# Patient Record
Sex: FEMALE | Race: WHITE | NOT HISPANIC OR LATINO | Employment: FULL TIME | URBAN - METROPOLITAN AREA
[De-identification: names, ages, dates, MRNs, and addresses within clinical notes are randomized per-mention and may not be internally consistent; named-entity substitution may affect disease eponyms.]

---

## 2018-07-09 LAB
HUMAN PAPILLOMAVIRUS (HPV): NORMAL
PAP RECOMMENDATION EXT: NORMAL
PAP SMEAR: NORMAL

## 2020-03-16 LAB
PAP RECOMMENDATION EXT: NORMAL
PAP SMEAR: NORMAL

## 2020-07-27 ENCOUNTER — HISTORICAL (OUTPATIENT)
Dept: ADMINISTRATIVE | Facility: HOSPITAL | Age: 40
End: 2020-07-27

## 2020-07-27 LAB — SARS-COV-2 RNA RESP QL NAA+PROBE: NOT DETECTED

## 2022-01-25 ENCOUNTER — HISTORICAL (OUTPATIENT)
Dept: LAB | Facility: HOSPITAL | Age: 42
End: 2022-01-25

## 2022-02-17 LAB
HUMAN PAPILLOMAVIRUS (HPV): NORMAL
PAP RECOMMENDATION EXT: NORMAL
PAP SMEAR: NORMAL

## 2022-04-07 ENCOUNTER — HISTORICAL (OUTPATIENT)
Dept: ADMINISTRATIVE | Facility: HOSPITAL | Age: 42
End: 2022-04-07

## 2022-04-23 VITALS
HEIGHT: 65 IN | OXYGEN SATURATION: 98 % | DIASTOLIC BLOOD PRESSURE: 71 MMHG | BODY MASS INDEX: 21.04 KG/M2 | SYSTOLIC BLOOD PRESSURE: 104 MMHG | WEIGHT: 126.31 LBS

## 2022-10-16 ENCOUNTER — OFFICE VISIT (OUTPATIENT)
Dept: URGENT CARE | Facility: CLINIC | Age: 42
End: 2022-10-16
Payer: COMMERCIAL

## 2022-10-16 VITALS
HEART RATE: 95 BPM | RESPIRATION RATE: 18 BRPM | BODY MASS INDEX: 20.16 KG/M2 | TEMPERATURE: 100 F | SYSTOLIC BLOOD PRESSURE: 113 MMHG | OXYGEN SATURATION: 98 % | DIASTOLIC BLOOD PRESSURE: 74 MMHG | WEIGHT: 121 LBS | HEIGHT: 65 IN

## 2022-10-16 DIAGNOSIS — U07.1 COVID-19: ICD-10-CM

## 2022-10-16 DIAGNOSIS — R50.9 FEVER, UNSPECIFIED FEVER CAUSE: Primary | ICD-10-CM

## 2022-10-16 LAB
CTP QC/QA: YES
MOLECULAR STREP A: NEGATIVE
POC MOLECULAR INFLUENZA A AGN: NEGATIVE
POC MOLECULAR INFLUENZA B AGN: NEGATIVE
SARS-COV-2 RDRP RESP QL NAA+PROBE: POSITIVE

## 2022-10-16 PROCEDURE — 3078F PR MOST RECENT DIASTOLIC BLOOD PRESSURE < 80 MM HG: ICD-10-PCS | Mod: CPTII,,, | Performed by: PHYSICIAN ASSISTANT

## 2022-10-16 PROCEDURE — 87651 STREP A DNA AMP PROBE: CPT | Mod: QW,,, | Performed by: PHYSICIAN ASSISTANT

## 2022-10-16 PROCEDURE — 99202 OFFICE O/P NEW SF 15 MIN: CPT | Mod: ,,, | Performed by: PHYSICIAN ASSISTANT

## 2022-10-16 PROCEDURE — 87635: ICD-10-PCS | Mod: QW,,, | Performed by: PHYSICIAN ASSISTANT

## 2022-10-16 PROCEDURE — 87502 POCT INFLUENZA A/B MOLECULAR: ICD-10-PCS | Mod: QW,,, | Performed by: PHYSICIAN ASSISTANT

## 2022-10-16 PROCEDURE — 1159F MED LIST DOCD IN RCRD: CPT | Mod: CPTII,,, | Performed by: PHYSICIAN ASSISTANT

## 2022-10-16 PROCEDURE — 87635 SARS-COV-2 COVID-19 AMP PRB: CPT | Mod: QW,,, | Performed by: PHYSICIAN ASSISTANT

## 2022-10-16 PROCEDURE — 3074F PR MOST RECENT SYSTOLIC BLOOD PRESSURE < 130 MM HG: ICD-10-PCS | Mod: CPTII,,, | Performed by: PHYSICIAN ASSISTANT

## 2022-10-16 PROCEDURE — 87502 INFLUENZA DNA AMP PROBE: CPT | Mod: QW,,, | Performed by: PHYSICIAN ASSISTANT

## 2022-10-16 PROCEDURE — 1160F PR REVIEW ALL MEDS BY PRESCRIBER/CLIN PHARMACIST DOCUMENTED: ICD-10-PCS | Mod: CPTII,,, | Performed by: PHYSICIAN ASSISTANT

## 2022-10-16 PROCEDURE — 99202 PR OFFICE/OUTPT VISIT, NEW, LEVL II, 15-29 MIN: ICD-10-PCS | Mod: ,,, | Performed by: PHYSICIAN ASSISTANT

## 2022-10-16 PROCEDURE — 1160F RVW MEDS BY RX/DR IN RCRD: CPT | Mod: CPTII,,, | Performed by: PHYSICIAN ASSISTANT

## 2022-10-16 PROCEDURE — 3074F SYST BP LT 130 MM HG: CPT | Mod: CPTII,,, | Performed by: PHYSICIAN ASSISTANT

## 2022-10-16 PROCEDURE — 87651 POCT STREP A MOLECULAR: ICD-10-PCS | Mod: QW,,, | Performed by: PHYSICIAN ASSISTANT

## 2022-10-16 PROCEDURE — 1159F PR MEDICATION LIST DOCUMENTED IN MEDICAL RECORD: ICD-10-PCS | Mod: CPTII,,, | Performed by: PHYSICIAN ASSISTANT

## 2022-10-16 PROCEDURE — 3078F DIAST BP <80 MM HG: CPT | Mod: CPTII,,, | Performed by: PHYSICIAN ASSISTANT

## 2022-10-16 RX ORDER — ALBUTEROL SULFATE 90 UG/1
1-2 AEROSOL, METERED RESPIRATORY (INHALATION) EVERY 6 HOURS PRN
Qty: 1 G | Refills: 0 | Status: SHIPPED | OUTPATIENT
Start: 2022-10-16 | End: 2023-07-18

## 2022-10-16 RX ORDER — ESCITALOPRAM OXALATE 20 MG/1
20 TABLET ORAL DAILY
COMMUNITY
Start: 2022-09-13 | End: 2023-04-03 | Stop reason: SDUPTHER

## 2022-10-16 RX ORDER — CODEINE PHOSPHATE AND GUAIFENESIN 10; 100 MG/5ML; MG/5ML
10 SOLUTION ORAL EVERY 6 HOURS PRN
Qty: 200 ML | Refills: 0 | Status: SHIPPED | OUTPATIENT
Start: 2022-10-16 | End: 2022-10-21

## 2022-10-16 RX ORDER — ACETAMINOPHEN, DIPHENHYDRAMINE HCL, PHENYLEPHRINE HCL 325; 25; 5 MG/1; MG/1; MG/1
10 TABLET ORAL NIGHTLY
COMMUNITY

## 2022-10-16 NOTE — PROGRESS NOTES
"Subjective:       Patient ID: Siri Cardoza is a 41 y.o. female.    Vitals:  height is 5' 5" (1.651 m) and weight is 54.9 kg (121 lb). Her oral temperature is 99.8 °F (37.7 °C). Her blood pressure is 113/74 and her pulse is 95. Her respiration is 18 and oxygen saturation is 98%.     Chief Complaint: Generalized Body Aches (HA, Throat and Chest has a burning sensation, Fever. Started yesterday. )    HPI female with acute body aches fever fatigue onset yesterday presents to urgent care for initial evaluation.   Generalized Body Aches     Additional comments: HA, Throat and Chest has a burning sensation, Fever.   Started yesterday.       Constitution: Positive for chills, fatigue and fever.   HENT:  Positive for congestion, postnasal drip and sore throat. Negative for ear pain, sinus pain, sinus pressure, trouble swallowing and voice change.    Neck: Negative for neck pain and neck swelling.   Cardiovascular:  Negative for chest pain.   Respiratory:  Negative for cough, shortness of breath, stridor and wheezing.    Gastrointestinal:  Positive for diarrhea.   Musculoskeletal:  Positive for muscle ache. Negative for pain, joint pain and back pain.   Skin: Negative.    Allergic/Immunologic: Negative.    Neurological:  Positive for headaches. Negative for altered mental status.   Psychiatric/Behavioral:  Negative for altered mental status.      Objective:      Physical Exam   Constitutional: She is oriented to person, place, and time. She appears well-developed. She is cooperative.  Non-toxic appearance. She appears ill.   HENT:   Head: Normocephalic and atraumatic.   Ears:   Right Ear: Hearing, tympanic membrane, external ear and ear canal normal.   Left Ear: Hearing, tympanic membrane, external ear and ear canal normal.   Nose: Congestion present. No mucosal edema, rhinorrhea or nasal deformity. No epistaxis. Right sinus exhibits no maxillary sinus tenderness and no frontal sinus tenderness. Left sinus exhibits no " maxillary sinus tenderness and no frontal sinus tenderness.   Mouth/Throat: Uvula is midline, oropharynx is clear and moist and mucous membranes are normal. No trismus in the jaw. Normal dentition. No uvula swelling. No oropharyngeal exudate, posterior oropharyngeal edema or posterior oropharyngeal erythema.   Eyes: Conjunctivae and lids are normal. No scleral icterus.   Neck: Trachea normal and phonation normal. Neck supple. No edema present. No erythema present. No neck rigidity present.   Cardiovascular: Normal rate, regular rhythm, normal heart sounds and normal pulses.   Pulmonary/Chest: Effort normal and breath sounds normal. No respiratory distress. She has no decreased breath sounds. She has no wheezes. She has no rhonchi.   Abdominal: Normal appearance.   Musculoskeletal: Normal range of motion.         General: No deformity. Normal range of motion.      Cervical back: She exhibits no tenderness.   Neurological: no focal deficit. She is alert and oriented to person, place, and time. She exhibits normal muscle tone. Coordination normal.   Skin: Skin is warm, dry, intact, not diaphoretic and not pale. Capillary refill takes less than 2 seconds.   Psychiatric: Her speech is normal and behavior is normal. Mood, judgment and thought content normal.   Nursing note and vitals reviewed.         Previous History      Review of patient's allergies indicates:  No Known Allergies    Past Medical History:   Diagnosis Date    Anxiety     Depression      Current Outpatient Medications   Medication Instructions    albuterol (PROVENTIL/VENTOLIN HFA) 90 mcg/actuation inhaler 1-2 puffs, Inhalation, Every 6 hours PRN, Rescue    EScitalopram oxalate (LEXAPRO) 20 mg, Oral, Daily    guaiFENesin-codeine 100-10 mg/5 ml (TUSSI-ORGANIDIN NR)  mg/5 mL syrup 10 mLs, Oral, Every 6 hours PRN    melatonin 10 mg, Oral, Nightly    nirmatrelvir-ritonavir 300 mg (150 mg x 2)-100 mg copackaged tablets (EUA) Take 3 tablets by mouth  "2 (two) times daily. Each dose contains 2 nirmatrelvir (pink tablets) and 1 ritonavir (white tablet). Take all 3 tablets together     Past Surgical History:   Procedure Laterality Date    AUGMENTATION OF BREAST Bilateral     WISDOM TOOTH EXTRACTION       Family History   Problem Relation Age of Onset    Acute lymphoblastic leukemia Mother     Hyperlipidemia Father     Hypertension Father     Diabetes Father     Heart attack Brother     Hypertension Brother        Social History     Tobacco Use    Smoking status: Every Day     Types: Cigarettes, Vaping with nicotine    Smokeless tobacco: Never   Substance Use Topics    Alcohol use: Yes    Drug use: Never        Physical Exam      Vital Signs Reviewed   /74   Pulse 95   Temp 99.8 °F (37.7 °C) (Oral)   Resp 18   Ht 5' 5" (1.651 m)   Wt 54.9 kg (121 lb)   LMP 10/02/2022   SpO2 98%   BMI 20.14 kg/m²        Procedures    Procedures     Labs     Results for orders placed or performed in visit on 10/16/22   POCT COVID-19 Rapid Screening   Result Value Ref Range    POC Rapid COVID Positive (A) Negative     Acceptable Yes    POCT Influenza A/B MOLECULAR   Result Value Ref Range    POC Molecular Influenza A Ag Negative Negative, Not Reported    POC Molecular Influenza B Ag Negative Negative, Not Reported     Acceptable Yes    POCT Strep A, Molecular   Result Value Ref Range    Molecular Strep A, POC Negative Negative     Acceptable Yes        Assessment:       1. Fever, unspecified fever cause    2. COVID-19            Plan:       +Covid  testing today    Start vitamin C 1000mg twice a day, zinc 100mg once a day, and vitamin D3 at least 2000 units a day. Current CDC guidelines recommend that you quarantine for 5 days starting the day after your symptoms began. Quarantine can end after 5 days as long as the last 24 hours of quarantine you are fever free and there is improvement of all your symptoms. Wear a " mask around others for 5 additional days after quarantine. Treat your symptoms as you would the common cold. If you live with anybody, isolate yourself in a separate bedroom and use a separate bathroom. If your symptoms worsen or you develop shortness of breath or a fever over 102.5 , seek medical attention immediately.  May start Paxlovid to help reduce viral sick time.  Cheratussin sparingly lows does as needed for severe cough and congestion do not take sedating narcotic cough medication and drive or operate machinery.  Albuterol inhaler 2 puffs up to 4-6 times daily as needed for cough wheeze or shortness of breath.  Recommend follow-up with primary care physician in 5-7 days for re-evaluation if not improving.  Recommended emergency department evaluation sooner if worsens  Fever, unspecified fever cause  -     POCT COVID-19 Rapid Screening  -     POCT Influenza A/B MOLECULAR  -     POCT Strep A, Molecular    COVID-19  -     guaiFENesin-codeine 100-10 mg/5 ml (TUSSI-ORGANIDIN NR)  mg/5 mL syrup; Take 10 mLs by mouth every 6 (six) hours as needed.  Dispense: 200 mL; Refill: 0  -     albuterol (PROVENTIL/VENTOLIN HFA) 90 mcg/actuation inhaler; Inhale 1-2 puffs into the lungs every 6 (six) hours as needed for Wheezing or Shortness of Breath. Rescue  Dispense: 1 g; Refill: 0  -     nirmatrelvir-ritonavir 300 mg (150 mg x 2)-100 mg copackaged tablets (EUA); Take 3 tablets by mouth 2 (two) times daily. Each dose contains 2 nirmatrelvir (pink tablets) and 1 ritonavir (white tablet). Take all 3 tablets together  Dispense: 30 tablet; Refill: 0

## 2022-10-16 NOTE — PATIENT INSTRUCTIONS
+Covid, Neg Strep, Neg Flu  testing today    Start vitamin C 1000mg twice a day, zinc 100mg once a day, and vitamin D3 at least 2000 units a day. Current CDC guidelines recommend that you quarantine for 5 days starting the day after your symptoms began. Quarantine can end after 5 days as long as the last 24 hours of quarantine you are fever free and there is improvement of all your symptoms. Wear a mask around others for 5 additional days after quarantine. Treat your symptoms as you would the common cold. If you live with anybody, isolate yourself in a separate bedroom and use a separate bathroom. If your symptoms worsen or you develop shortness of breath or a fever over 102.5 , seek medical attention immediately.  May start Paxlovid to help reduce viral sick time.  Cheratussin sparingly lows does as needed for severe cough and congestion do not take sedating narcotic cough medication and drive or operate machinery.  Albuterol inhaler 2 puffs up to 4-6 times daily as needed for cough wheeze or shortness of breath.  Recommend follow-up with primary care physician in 5-7 days for re-evaluation if not improving.  Recommended emergency department evaluation sooner if worsens

## 2022-10-16 NOTE — LETTER
October 16, 2022      [facility]  [address] [address]  Phone: [phone]  Fax: [phone]       Patient: Siri Cardoza   YOB: 1980  Date of Visit: 10/16/2022    To Whom It May Concern:    Dameon Cardoza  was at [facility] on 10/16/2022. She may return to work/school on 10/24/2022 with no restrictions. If you have any questions or concerns, or if I can be of further assistance, please do not hesitate to contact me.    Sincerely,    Francesca Cosme MA

## 2023-01-20 ENCOUNTER — OFFICE VISIT (OUTPATIENT)
Dept: URGENT CARE | Facility: CLINIC | Age: 43
End: 2023-01-20
Payer: COMMERCIAL

## 2023-01-20 VITALS
BODY MASS INDEX: 20.99 KG/M2 | HEIGHT: 65 IN | WEIGHT: 126 LBS | OXYGEN SATURATION: 99 % | DIASTOLIC BLOOD PRESSURE: 95 MMHG | RESPIRATION RATE: 18 BRPM | HEART RATE: 81 BPM | SYSTOLIC BLOOD PRESSURE: 137 MMHG | TEMPERATURE: 98 F

## 2023-01-20 DIAGNOSIS — R05.9 COUGH, UNSPECIFIED TYPE: ICD-10-CM

## 2023-01-20 DIAGNOSIS — J20.9 ACUTE BRONCHITIS, UNSPECIFIED ORGANISM: ICD-10-CM

## 2023-01-20 DIAGNOSIS — R09.81 NASAL CONGESTION: Primary | ICD-10-CM

## 2023-01-20 LAB
CTP QC/QA: YES
CTP QC/QA: YES
POC MOLECULAR INFLUENZA A AGN: NEGATIVE
POC MOLECULAR INFLUENZA B AGN: NEGATIVE
SARS-COV-2 RDRP RESP QL NAA+PROBE: NEGATIVE

## 2023-01-20 PROCEDURE — 87635: ICD-10-PCS | Mod: QW,,, | Performed by: PHYSICIAN ASSISTANT

## 2023-01-20 PROCEDURE — 3075F PR MOST RECENT SYSTOLIC BLOOD PRESS GE 130-139MM HG: ICD-10-PCS | Mod: CPTII,,, | Performed by: PHYSICIAN ASSISTANT

## 2023-01-20 PROCEDURE — 3075F SYST BP GE 130 - 139MM HG: CPT | Mod: CPTII,,, | Performed by: PHYSICIAN ASSISTANT

## 2023-01-20 PROCEDURE — 3080F PR MOST RECENT DIASTOLIC BLOOD PRESSURE >= 90 MM HG: ICD-10-PCS | Mod: CPTII,,, | Performed by: PHYSICIAN ASSISTANT

## 2023-01-20 PROCEDURE — 96372 THER/PROPH/DIAG INJ SC/IM: CPT | Mod: ,,, | Performed by: PHYSICIAN ASSISTANT

## 2023-01-20 PROCEDURE — 99213 OFFICE O/P EST LOW 20 MIN: CPT | Mod: 25,,, | Performed by: PHYSICIAN ASSISTANT

## 2023-01-20 PROCEDURE — 3008F BODY MASS INDEX DOCD: CPT | Mod: CPTII,,, | Performed by: PHYSICIAN ASSISTANT

## 2023-01-20 PROCEDURE — 87502 POCT INFLUENZA A/B MOLECULAR: ICD-10-PCS | Mod: QW,,, | Performed by: PHYSICIAN ASSISTANT

## 2023-01-20 PROCEDURE — 87502 INFLUENZA DNA AMP PROBE: CPT | Mod: QW,,, | Performed by: PHYSICIAN ASSISTANT

## 2023-01-20 PROCEDURE — 96372 PR INJECTION,THERAP/PROPH/DIAG2ST, IM OR SUBCUT: ICD-10-PCS | Mod: ,,, | Performed by: PHYSICIAN ASSISTANT

## 2023-01-20 PROCEDURE — 87635 SARS-COV-2 COVID-19 AMP PRB: CPT | Mod: QW,,, | Performed by: PHYSICIAN ASSISTANT

## 2023-01-20 PROCEDURE — 3008F PR BODY MASS INDEX (BMI) DOCUMENTED: ICD-10-PCS | Mod: CPTII,,, | Performed by: PHYSICIAN ASSISTANT

## 2023-01-20 PROCEDURE — 1159F PR MEDICATION LIST DOCUMENTED IN MEDICAL RECORD: ICD-10-PCS | Mod: CPTII,,, | Performed by: PHYSICIAN ASSISTANT

## 2023-01-20 PROCEDURE — 3080F DIAST BP >= 90 MM HG: CPT | Mod: CPTII,,, | Performed by: PHYSICIAN ASSISTANT

## 2023-01-20 PROCEDURE — 1160F RVW MEDS BY RX/DR IN RCRD: CPT | Mod: CPTII,,, | Performed by: PHYSICIAN ASSISTANT

## 2023-01-20 PROCEDURE — 1160F PR REVIEW ALL MEDS BY PRESCRIBER/CLIN PHARMACIST DOCUMENTED: ICD-10-PCS | Mod: CPTII,,, | Performed by: PHYSICIAN ASSISTANT

## 2023-01-20 PROCEDURE — 99213 PR OFFICE/OUTPT VISIT, EST, LEVL III, 20-29 MIN: ICD-10-PCS | Mod: 25,,, | Performed by: PHYSICIAN ASSISTANT

## 2023-01-20 PROCEDURE — 1159F MED LIST DOCD IN RCRD: CPT | Mod: CPTII,,, | Performed by: PHYSICIAN ASSISTANT

## 2023-01-20 RX ORDER — PROMETHAZINE HYDROCHLORIDE AND DEXTROMETHORPHAN HYDROBROMIDE 6.25; 15 MG/5ML; MG/5ML
5 SYRUP ORAL EVERY 8 HOURS PRN
Qty: 118 ML | Refills: 0 | Status: SHIPPED | OUTPATIENT
Start: 2023-01-20 | End: 2023-01-20

## 2023-01-20 RX ORDER — METHYLPREDNISOLONE 4 MG/1
TABLET ORAL
Qty: 1 EACH | Refills: 0 | Status: SHIPPED | OUTPATIENT
Start: 2023-01-20 | End: 2023-07-18

## 2023-01-20 RX ORDER — MULTIVITAMIN
1 TABLET ORAL DAILY
COMMUNITY

## 2023-01-20 RX ORDER — CODEINE PHOSPHATE AND GUAIFENESIN 10; 100 MG/5ML; MG/5ML
10 SOLUTION ORAL EVERY 6 HOURS PRN
Qty: 118 ML | Refills: 0 | Status: SHIPPED | OUTPATIENT
Start: 2023-01-20 | End: 2023-01-25

## 2023-01-20 RX ORDER — DEXAMETHASONE SODIUM PHOSPHATE 100 MG/10ML
8 INJECTION INTRAMUSCULAR; INTRAVENOUS
Status: COMPLETED | OUTPATIENT
Start: 2023-01-20 | End: 2023-01-20

## 2023-01-20 RX ORDER — AZITHROMYCIN 250 MG/1
250 TABLET, FILM COATED ORAL
COMMUNITY
Start: 2023-01-18 | End: 2023-07-18

## 2023-01-20 RX ADMIN — DEXAMETHASONE SODIUM PHOSPHATE 8 MG: 100 INJECTION INTRAMUSCULAR; INTRAVENOUS at 05:01

## 2023-01-20 NOTE — PROGRESS NOTES
"Subjective:       Patient ID: Siri Cardoza is a 42 y.o. female.    Vitals:  height is 5' 5" (1.651 m) and weight is 57.2 kg (126 lb). Her oral temperature is 97.9 °F (36.6 °C). Her blood pressure is 137/95 (abnormal) and her pulse is 81. Her respiration is 18 and oxygen saturation is 99%.     Chief Complaint: Cough and Nasal Congestion (Cough, nasal congestion x 1 week )    HPI  female reports vapes smoker noticing over the last week cough cold congestion night sweats body aches and fatigue states having negative COVID test at work given prescription by her nurse practitioner azithromycin antibiotic coverage states after 3 days on antibiotic still having persistent cough and chest tightness using leftover albuterol MDI this afternoon presents to urgent care for evaluation.   Nasal Congestion     Additional comments: Cough, nasal congestion x 1 week     Cough  Associated symptoms include myalgias and shortness of breath. Pertinent negatives include no ear pain, fever, headaches or sore throat.     Constitution: Positive for sweating and fatigue. Negative for fever.   HENT:  Positive for congestion and sinus pressure. Negative for ear pain, sinus pain, sore throat, trouble swallowing and voice change.    Neck: Negative for neck pain and neck swelling.   Cardiovascular:  Negative for palpitations.   Respiratory:  Positive for chest tightness, cough and shortness of breath. Negative for stridor.    Gastrointestinal: Negative.    Musculoskeletal:  Positive for muscle ache.   Skin: Negative.  Negative for erythema.   Allergic/Immunologic: Negative.    Neurological:  Negative for headaches and altered mental status.   Psychiatric/Behavioral:  Positive for sleep disturbance. Negative for altered mental status.      Objective:      Physical Exam   Constitutional: She is oriented to person, place, and time. She appears well-developed. She is cooperative.  Non-toxic appearance.      Comments:Awake alert ambulatory nasally " congested fatigued female speaks in complete sentences     HENT:   Head: Normocephalic.   Ears:   Right Ear: Hearing, tympanic membrane, external ear and ear canal normal.   Left Ear: Hearing, tympanic membrane, external ear and ear canal normal.   Nose: Congestion present. No mucosal edema, rhinorrhea or nasal deformity. No epistaxis. Right sinus exhibits no maxillary sinus tenderness and no frontal sinus tenderness. Left sinus exhibits no maxillary sinus tenderness and no frontal sinus tenderness.   Mouth/Throat: Uvula is midline, oropharynx is clear and moist and mucous membranes are normal. Mucous membranes are moist. No trismus in the jaw. Normal dentition. No uvula swelling. No oropharyngeal exudate, posterior oropharyngeal edema or posterior oropharyngeal erythema.   Eyes: Conjunctivae and lids are normal. No scleral icterus.   Neck: Trachea normal and phonation normal. Neck supple. No edema present. No erythema present. No neck rigidity present.   Cardiovascular: Normal rate, regular rhythm, normal heart sounds and normal pulses.   No murmur heard.  Pulmonary/Chest: Effort normal. No stridor. No respiratory distress. She has no decreased breath sounds. She has no wheezes. She has no rhonchi. She has no rales.         Comments: Mild Dry cough    Musculoskeletal: Normal range of motion.         General: No swelling. Normal range of motion.      Cervical back: She exhibits no tenderness.   Lymphadenopathy:     She has cervical adenopathy.   Neurological: no focal deficit. She is alert and oriented to person, place, and time. She exhibits normal muscle tone.   Skin: Skin is warm, dry, intact, not diaphoretic, not pale and no rash. No erythema   Psychiatric: Her speech is normal and behavior is normal. Mood, judgment and thought content normal.   Nursing note and vitals reviewed.         Previous History      Review of patient's allergies indicates:  No Known Allergies    Past Medical History:   Diagnosis Date     "Anxiety     Depression     Known health problems: none      Current Outpatient Medications   Medication Instructions    albuterol (PROVENTIL/VENTOLIN HFA) 90 mcg/actuation inhaler 1-2 puffs, Inhalation, Every 6 hours PRN, Rescue    azithromycin (Z-JENNY) 250 mg, Oral    EScitalopram oxalate (LEXAPRO) 20 mg, Oral, Daily    melatonin 10 mg, Oral, Nightly    methylPREDNISolone (MEDROL DOSEPACK) 4 mg tablet use as directed    multivitamin with folic acid 400 mcg Tab 1 tablet, Oral, Daily    nirmatrelvir-ritonavir 300 mg (150 mg x 2)-100 mg copackaged tablets (EUA) Take 3 tablets by mouth 2 (two) times daily. Each dose contains 2 nirmatrelvir (pink tablets) and 1 ritonavir (white tablet). Take all 3 tablets together    promethazine-dextromethorphan (PROMETHAZINE-DM) 6.25-15 mg/5 mL Syrp 5 mLs, Oral, Every 8 hours PRN     Past Surgical History:   Procedure Laterality Date    AUGMENTATION OF BREAST Bilateral     WISDOM TOOTH EXTRACTION       Family History   Problem Relation Age of Onset    Acute lymphoblastic leukemia Mother     Hyperlipidemia Father     Hypertension Father     Diabetes Father     Heart attack Brother     Hypertension Brother        Social History     Tobacco Use    Smoking status: Every Day     Types: Cigarettes, Vaping with nicotine    Smokeless tobacco: Never   Substance Use Topics    Alcohol use: Yes    Drug use: Never        Physical Exam      Vital Signs Reviewed   BP (!) 137/95   Pulse 81   Temp 97.9 °F (36.6 °C) (Oral)   Resp 18   Ht 5' 5" (1.651 m)   Wt 57.2 kg (126 lb)   SpO2 99%   BMI 20.97 kg/m²        Procedures    Procedures     Labs     Results for orders placed or performed in visit on 01/20/23   POCT COVID-19 Rapid Screening   Result Value Ref Range    POC Rapid COVID Negative Negative     Acceptable Yes    POCT Influenza A/B MOLECULAR   Result Value Ref Range    POC Molecular Influenza A Ag Negative Negative, Not Reported    POC Molecular Influenza B Ag Negative " Negative, Not Reported     Acceptable Yes        Assessment:       1. Nasal congestion    2. Cough, unspecified type    3. Acute bronchitis, unspecified organism          Plan:     Patient understands concern for acute bronchitis, vaping cessation, continued use of personal albuterol MDI and discharge with prescription cough medication and steroid.  Patient reports having restless leg problems when taking Phenergan are Phenergan DM.  Patient accepts Cheratussin as alternative.  Patient ready for discharge now.    Negative influenza, negative COVID testing today though high concern for acute viral bronchitis.    Recommend albuterol inhaler 2 puffs up to 4-6 times daily as needed for cough wheeze chest tightness or shortness for breath recommend steroid Dosepak starting tomorrow in addition to steroid injection received today to help reduce cough and inflammation.  Cheratussin sparingly lows doses needed for severe cough and rest.  Do not take sedating cough medication drive or operate machinery.  Recommend smoking and vaping cessation short and long-term.  Recommend follow-up with primary care physician in 1 week for re-evaluation concern for bronchitis re-evaluation and continued long-term care planning if not improving.  Nasal congestion  -     POCT COVID-19 Rapid Screening  -     POCT Influenza A/B MOLECULAR    Cough, unspecified type    Acute bronchitis, unspecified organism    Other orders  -     dexAMETHasone injection 8 mg  -     methylPREDNISolone (MEDROL DOSEPACK) 4 mg tablet; use as directed  Dispense: 1 each; Refill: 0  -     promethazine-dextromethorphan (PROMETHAZINE-DM) 6.25-15 mg/5 mL Syrp; Take 5 mLs by mouth every 8 (eight) hours as needed (cough).  Dispense: 118 mL; Refill: 0

## 2023-01-20 NOTE — PATIENT INSTRUCTIONS
Negative influenza, negative COVID testing today though high concern for acute viral bronchitis.    Recommend albuterol inhaler 2 puffs up to 4-6 times daily as needed for cough wheeze chest tightness or shortness for breath recommend steroid Dosepak starting tomorrow in addition to steroid injection received today to help reduce cough and inflammation.  Cheratussin sparingly lows doses needed for severe cough and rest.  Do not take sedating cough medication drive or operate machinery.  Recommend smoking and vaping cessation short and long-term.  Recommend follow-up with primary care physician in 1 week for re-evaluation concern for bronchitis re-evaluation and continued long-term care planning if not improving.

## 2023-04-03 DIAGNOSIS — F41.9 ANXIETY: Primary | ICD-10-CM

## 2023-04-03 RX ORDER — ESCITALOPRAM OXALATE 20 MG/1
20 TABLET ORAL DAILY
Qty: 30 TABLET | Refills: 2 | Status: SHIPPED | OUTPATIENT
Start: 2023-04-03 | End: 2023-07-10

## 2023-07-08 DIAGNOSIS — F41.9 ANXIETY: ICD-10-CM

## 2023-07-10 RX ORDER — ESCITALOPRAM OXALATE 20 MG/1
TABLET ORAL
Qty: 30 TABLET | Refills: 5 | Status: SHIPPED | OUTPATIENT
Start: 2023-07-10

## 2023-07-18 ENCOUNTER — DOCUMENTATION ONLY (OUTPATIENT)
Dept: FAMILY MEDICINE | Facility: CLINIC | Age: 43
End: 2023-07-18

## 2023-07-18 ENCOUNTER — OFFICE VISIT (OUTPATIENT)
Dept: FAMILY MEDICINE | Facility: CLINIC | Age: 43
End: 2023-07-18
Payer: COMMERCIAL

## 2023-07-18 VITALS
HEIGHT: 65 IN | BODY MASS INDEX: 21.05 KG/M2 | OXYGEN SATURATION: 99 % | WEIGHT: 126.38 LBS | DIASTOLIC BLOOD PRESSURE: 76 MMHG | SYSTOLIC BLOOD PRESSURE: 108 MMHG | TEMPERATURE: 99 F | RESPIRATION RATE: 18 BRPM | HEART RATE: 84 BPM

## 2023-07-18 DIAGNOSIS — Z00.00 ROUTINE GENERAL MEDICAL EXAMINATION AT A HEALTH CARE FACILITY: Primary | ICD-10-CM

## 2023-07-18 DIAGNOSIS — F32.5 MAJOR DEPRESSIVE DISORDER, SINGLE EPISODE, IN FULL REMISSION: ICD-10-CM

## 2023-07-18 DIAGNOSIS — Z12.31 BREAST CANCER SCREENING BY MAMMOGRAM: ICD-10-CM

## 2023-07-18 PROCEDURE — 3008F PR BODY MASS INDEX (BMI) DOCUMENTED: ICD-10-PCS | Mod: CPTII,,, | Performed by: FAMILY MEDICINE

## 2023-07-18 PROCEDURE — 3074F PR MOST RECENT SYSTOLIC BLOOD PRESSURE < 130 MM HG: ICD-10-PCS | Mod: CPTII,,, | Performed by: FAMILY MEDICINE

## 2023-07-18 PROCEDURE — 1159F PR MEDICATION LIST DOCUMENTED IN MEDICAL RECORD: ICD-10-PCS | Mod: CPTII,,, | Performed by: FAMILY MEDICINE

## 2023-07-18 PROCEDURE — 1160F RVW MEDS BY RX/DR IN RCRD: CPT | Mod: CPTII,,, | Performed by: FAMILY MEDICINE

## 2023-07-18 PROCEDURE — 1159F MED LIST DOCD IN RCRD: CPT | Mod: CPTII,,, | Performed by: FAMILY MEDICINE

## 2023-07-18 PROCEDURE — 3074F SYST BP LT 130 MM HG: CPT | Mod: CPTII,,, | Performed by: FAMILY MEDICINE

## 2023-07-18 PROCEDURE — 99396 PREV VISIT EST AGE 40-64: CPT | Mod: ,,, | Performed by: FAMILY MEDICINE

## 2023-07-18 PROCEDURE — 99396 PR PREVENTIVE VISIT,EST,40-64: ICD-10-PCS | Mod: ,,, | Performed by: FAMILY MEDICINE

## 2023-07-18 PROCEDURE — 3078F PR MOST RECENT DIASTOLIC BLOOD PRESSURE < 80 MM HG: ICD-10-PCS | Mod: CPTII,,, | Performed by: FAMILY MEDICINE

## 2023-07-18 PROCEDURE — 3078F DIAST BP <80 MM HG: CPT | Mod: CPTII,,, | Performed by: FAMILY MEDICINE

## 2023-07-18 PROCEDURE — 1160F PR REVIEW ALL MEDS BY PRESCRIBER/CLIN PHARMACIST DOCUMENTED: ICD-10-PCS | Mod: CPTII,,, | Performed by: FAMILY MEDICINE

## 2023-07-18 PROCEDURE — 3008F BODY MASS INDEX DOCD: CPT | Mod: CPTII,,, | Performed by: FAMILY MEDICINE

## 2023-07-18 NOTE — PROGRESS NOTES
Patient ID: [id number]     Chief Complaint: Annual Exam        HPI:     Siri Cardoza is a 42 y.o. female here today for an annual wellness visit.  She has not had bloodwork completed prior to visit as ordered but will do so in the near future. Overall she feels well. No other complaints today.         ----------------------------  Anxiety  Major depressive disorder     Past Surgical History:   Procedure Laterality Date    AUGMENTATION OF BREAST Bilateral     WISDOM TOOTH EXTRACTION         Review of patient's allergies indicates:   Allergen Reactions    Phenergan plain      reaction       Outpatient Medications Marked as Taking for the 7/18/23 encounter (Office Visit) with Sincere Rehman, DO   Medication Sig Dispense Refill    EScitalopram oxalate (LEXAPRO) 20 MG tablet TAKE 1 TABLET BY MOUTH EVERY DAY 30 tablet 5    melatonin 10 mg Tab Take 10 mg by mouth every evening.      multivitamin with folic acid 400 mcg Tab Take 1 tablet by mouth once daily.         Social History     Socioeconomic History    Marital status: Unknown   Tobacco Use    Smoking status: Every Day     Types: Cigarettes, Vaping with nicotine    Smokeless tobacco: Never   Substance and Sexual Activity    Alcohol use: Yes    Drug use: Never    Sexual activity: Yes     Social Determinants of Health     Financial Resource Strain: Low Risk     Difficulty of Paying Living Expenses: Not hard at all   Food Insecurity: No Food Insecurity    Worried About Running Out of Food in the Last Year: Never true    Ran Out of Food in the Last Year: Never true   Transportation Needs: No Transportation Needs    Lack of Transportation (Medical): No    Lack of Transportation (Non-Medical): No   Physical Activity: Sufficiently Active    Days of Exercise per Week: 5 days    Minutes of Exercise per Session: 30 min   Stress: No Stress Concern Present    Feeling of Stress : Only a little   Social Connections: Moderately Integrated    Frequency of Communication with  "Friends and Family: More than three times a week    Frequency of Social Gatherings with Friends and Family: Three times a week    Attends Islam Services: More than 4 times per year    Active Member of Clubs or Organizations: No    Attends Club or Organization Meetings: 1 to 4 times per year    Marital Status:    Housing Stability: Low Risk     Unable to Pay for Housing in the Last Year: No    Number of Places Lived in the Last Year: 1    Unstable Housing in the Last Year: No        Family History   Problem Relation Age of Onset    Acute lymphoblastic leukemia Mother     Hyperlipidemia Father     Hypertension Father     Diabetes Father     Heart attack Brother     Hypertension Brother         Patient Care Team:  Sincere Rehman DO as PCP - General (Family Medicine)       Subjective:     Review of Systems   Constitutional:  Positive for malaise/fatigue. Negative for chills and fever.   Respiratory:  Negative for shortness of breath.    Cardiovascular:  Negative for chest pain.   Gastrointestinal:  Negative for constipation and diarrhea.   Neurological:  Negative for headaches.   Psychiatric/Behavioral:  Negative for depression. The patient is not nervous/anxious and does not have insomnia.      See HPI for details      All Other ROS: Negative except as stated in HPI.       Objective:     /76   Pulse 84   Temp 98.6 °F (37 °C) (Tympanic)   Resp 18   Ht 5' 4.96" (1.65 m)   Wt 57.3 kg (126 lb 6.4 oz)   LMP 07/16/2023   SpO2 99%   BMI 21.06 kg/m²     Physical Exam  Vitals reviewed.   Constitutional:       General: She is not in acute distress.     Appearance: Normal appearance.   Cardiovascular:      Rate and Rhythm: Normal rate and regular rhythm.      Heart sounds: No murmur heard.    No friction rub. No gallop.   Pulmonary:      Effort: No respiratory distress.      Breath sounds: No wheezing, rhonchi or rales.   Musculoskeletal:         General: No swelling, tenderness or deformity.      " Right lower leg: No edema.      Left lower leg: No edema.   Skin:     General: Skin is warm and dry.      Findings: No lesion or rash.   Neurological:      General: No focal deficit present.      Mental Status: She is alert.   Psychiatric:         Mood and Affect: Mood normal.         Assessment:       ICD-10-CM ICD-9-CM   1. Routine general medical examination at a health care facility  Z00.00 V70.0   2. Major depressive disorder, single episode, in full remission  F32.5 296.26   3. Breast cancer screening by mammogram  Z12.31 V76.12        Plan:         Health Maintenance Topics with due status: Not Due       Topic Last Completion Date    TETANUS VACCINE 07/14/2017    Cervical Cancer Screening 02/17/2022    Influenza Vaccine 11/16/2022        Cervical Cancer Screening -2/17/22. Next due 2/17/25.   Breast Cancer Screening - Will order today.     Eye Exam - Recommend annually.  Dental Exam - Recommend biannual exams.     Vaccinations -   Immunization History   Administered Date(s) Administered    COVID-19, MRNA, LN-S, PF (Pfizer) (Purple Cap) 07/29/2021, 08/18/2021    DTP 02/14/1983, 04/18/1983, 06/20/1983, 03/19/1984, 04/07/1988    Influenza - Quadrivalent - PF *Preferred* (6 months and older) 09/25/2019, 09/16/2020, 12/16/2021, 11/16/2022    MMR 03/19/1984    OPV 02/14/1983, 04/18/1983, 06/20/1983, 03/19/1984, 04/07/1988    Tdap 07/14/2017            1. Routine general medical examination at a health care facility    2. Major depressive disorder, single episode, in full remission  well controlled on current prescription medication    3. Breast cancer screening by mammogram  - Mammo Digital Screening Bilat w/ [facility]; Future        Medication List with Changes/Refills   Current Medications    ESCITALOPRAM OXALATE (LEXAPRO) 20 MG TABLET    TAKE 1 TABLET BY MOUTH EVERY DAY       Start Date: 7/10/2023 End Date: --    MELATONIN 10 MG TAB    Take 10 mg by mouth every evening.       Start Date: --        End Date: --     MULTIVITAMIN WITH FOLIC ACID 400 MCG TAB    Take 1 tablet by mouth once daily.       Start Date: --        End Date: --   Discontinued Medications    ALBUTEROL (PROVENTIL/VENTOLIN HFA) 90 MCG/ACTUATION INHALER    Inhale 1-2 puffs into the lungs every 6 (six) hours as needed for Wheezing or Shortness of Breath. Rescue       Start Date: 10/16/2022End Date: 7/18/2023    AZITHROMYCIN (Z-JENNY) 250 MG TABLET    Take 250 mg by mouth.       Start Date: 1/18/2023 End Date: 7/18/2023    METHYLPREDNISOLONE (MEDROL DOSEPACK) 4 MG TABLET    use as directed       Start Date: 1/20/2023 End Date: 7/18/2023    NIRMATRELVIR-RITONAVIR 300 MG (150 MG X 2)-100 MG COPACKAGED TABLETS (EUA)    Take 3 tablets by mouth 2 (two) times daily. Each dose contains 2 nirmatrelvir (pink tablets) and 1 ritonavir (white tablet). Take all 3 tablets together       Start Date: 10/16/2022End Date: 7/18/2023          The patient's Health Maintenance was reviewed and the following appears to be due at this time:   Health Maintenance Due   Topic Date Due    Hepatitis C Screening  Never done    Pneumococcal Vaccines (Age 0-64) (1 - PCV) Never done    HIV Screening  Never done    Mammogram  Never done    COVID-19 Vaccine (3 - Pfizer series) 10/13/2021         Follow up in about 1 year (around 7/18/2024) for Wellness. In addition to their scheduled follow up, the patient has also been instructed to follow up on as needed basis.

## 2023-07-19 ENCOUNTER — DOCUMENTATION ONLY (OUTPATIENT)
Dept: FAMILY MEDICINE | Facility: CLINIC | Age: 43
End: 2023-07-19
Payer: COMMERCIAL

## 2023-08-09 ENCOUNTER — HOSPITAL ENCOUNTER (OUTPATIENT)
Dept: RADIOLOGY | Facility: HOSPITAL | Age: 43
Discharge: HOME | End: 2023-08-09
Attending: FAMILY MEDICINE
Payer: COMMERCIAL

## 2023-08-09 DIAGNOSIS — Z12.31 BREAST CANCER SCREENING BY MAMMOGRAM: ICD-10-CM

## 2023-08-09 PROCEDURE — 77063 MAMMO DIGITAL SCREENING BILAT WITH TOMO: ICD-10-PCS | Mod: 26,,, | Performed by: STUDENT IN AN ORGANIZED HEALTH CARE EDUCATION/TRAINING PROGRAM

## 2023-08-09 PROCEDURE — 77063 BREAST TOMOSYNTHESIS BI: CPT | Mod: 26,,, | Performed by: STUDENT IN AN ORGANIZED HEALTH CARE EDUCATION/TRAINING PROGRAM

## 2023-08-09 PROCEDURE — 77067 SCR MAMMO BI INCL CAD: CPT | Mod: 26,,, | Performed by: STUDENT IN AN ORGANIZED HEALTH CARE EDUCATION/TRAINING PROGRAM

## 2023-08-09 PROCEDURE — 77067 MAMMO DIGITAL SCREENING BILAT WITH TOMO: ICD-10-PCS | Mod: 26,,, | Performed by: STUDENT IN AN ORGANIZED HEALTH CARE EDUCATION/TRAINING PROGRAM

## 2023-08-09 PROCEDURE — 77067 SCR MAMMO BI INCL CAD: CPT | Mod: TC

## 2023-09-01 ENCOUNTER — OFFICE VISIT (OUTPATIENT)
Dept: URGENT CARE | Facility: CLINIC | Age: 43
End: 2023-09-01
Payer: COMMERCIAL

## 2023-09-01 VITALS
RESPIRATION RATE: 18 BRPM | HEART RATE: 82 BPM | WEIGHT: 126 LBS | BODY MASS INDEX: 21.51 KG/M2 | TEMPERATURE: 99 F | HEIGHT: 64 IN | SYSTOLIC BLOOD PRESSURE: 113 MMHG | OXYGEN SATURATION: 98 % | DIASTOLIC BLOOD PRESSURE: 81 MMHG

## 2023-09-01 DIAGNOSIS — R09.81 CONGESTION OF NASAL SINUS: Primary | ICD-10-CM

## 2023-09-01 DIAGNOSIS — J10.1 INFLUENZA A: ICD-10-CM

## 2023-09-01 LAB
CTP QC/QA: YES
MOLECULAR STREP A: NEGATIVE
POC MOLECULAR INFLUENZA A AGN: POSITIVE
POC MOLECULAR INFLUENZA B AGN: NEGATIVE
SARS-COV-2 RDRP RESP QL NAA+PROBE: NEGATIVE

## 2023-09-01 PROCEDURE — 87651 STREP A DNA AMP PROBE: CPT | Mod: QW,,, | Performed by: PHYSICIAN ASSISTANT

## 2023-09-01 PROCEDURE — 87635: ICD-10-PCS | Mod: QW,,, | Performed by: PHYSICIAN ASSISTANT

## 2023-09-01 PROCEDURE — 99214 OFFICE O/P EST MOD 30 MIN: CPT | Mod: 25,,, | Performed by: PHYSICIAN ASSISTANT

## 2023-09-01 PROCEDURE — 87635 SARS-COV-2 COVID-19 AMP PRB: CPT | Mod: QW,,, | Performed by: PHYSICIAN ASSISTANT

## 2023-09-01 PROCEDURE — 99214 PR OFFICE/OUTPT VISIT, EST, LEVL IV, 30-39 MIN: ICD-10-PCS | Mod: 25,,, | Performed by: PHYSICIAN ASSISTANT

## 2023-09-01 PROCEDURE — 87651 POCT STREP A MOLECULAR: ICD-10-PCS | Mod: QW,,, | Performed by: PHYSICIAN ASSISTANT

## 2023-09-01 PROCEDURE — 96372 THER/PROPH/DIAG INJ SC/IM: CPT | Mod: ,,, | Performed by: PHYSICIAN ASSISTANT

## 2023-09-01 PROCEDURE — 87502 INFLUENZA DNA AMP PROBE: CPT | Mod: QW,,, | Performed by: PHYSICIAN ASSISTANT

## 2023-09-01 PROCEDURE — 96372 PR INJECTION,THERAP/PROPH/DIAG2ST, IM OR SUBCUT: ICD-10-PCS | Mod: ,,, | Performed by: PHYSICIAN ASSISTANT

## 2023-09-01 PROCEDURE — 87502 POCT INFLUENZA A/B MOLECULAR: ICD-10-PCS | Mod: QW,,, | Performed by: PHYSICIAN ASSISTANT

## 2023-09-01 RX ORDER — OSELTAMIVIR PHOSPHATE 75 MG/1
75 CAPSULE ORAL 2 TIMES DAILY
Qty: 10 CAPSULE | Refills: 0 | Status: SHIPPED | OUTPATIENT
Start: 2023-09-01 | End: 2023-09-06

## 2023-09-01 RX ORDER — PREDNISONE 10 MG/1
10 TABLET ORAL 2 TIMES DAILY
Qty: 10 TABLET | Refills: 0 | Status: SHIPPED | OUTPATIENT
Start: 2023-09-01 | End: 2023-09-06

## 2023-09-01 RX ORDER — CODEINE PHOSPHATE AND GUAIFENESIN 10; 100 MG/5ML; MG/5ML
10 SOLUTION ORAL EVERY 6 HOURS PRN
Qty: 118 ML | Refills: 0 | Status: SHIPPED | OUTPATIENT
Start: 2023-09-01 | End: 2023-09-06

## 2023-09-01 RX ORDER — DEXAMETHASONE SODIUM PHOSPHATE 100 MG/10ML
10 INJECTION INTRAMUSCULAR; INTRAVENOUS
Status: COMPLETED | OUTPATIENT
Start: 2023-09-01 | End: 2023-09-01

## 2023-09-01 RX ADMIN — DEXAMETHASONE SODIUM PHOSPHATE 10 MG: 100 INJECTION INTRAMUSCULAR; INTRAVENOUS at 12:09

## 2023-09-01 NOTE — PROGRESS NOTES
"Subjective:      Patient ID: Siri Cardoza is a 42 y.o. female.    Vitals:  height is 5' 4" (1.626 m) and weight is 57.2 kg (126 lb). Her temperature is 99.2 °F (37.3 °C). Her blood pressure is 113/81 and her pulse is 82. Her respiration is 18 and oxygen saturation is 98%.     Chief Complaint: Cough (Pt presents to clinic with cough, congestion, fatigue , headache. Symptomatic x last night.)    HPI  patient reports acute onset of symptoms last night waking up this morning still feeling ill presents to urgent Care for initial evaluation.  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   Cough     Additional comments: Pt presents to clinic with cough, congestion,   fatigue , headache. Symptomatic x last night.    Cough  This is a new problem. The current episode started yesterday. Associated symptoms include chills, headaches, myalgias and postnasal drip. Pertinent negatives include no chest pain, ear pain, fever, sore throat, shortness of breath or wheezing.       Constitution: Positive for chills and fatigue. Negative for fever.   HENT:  Positive for congestion, postnasal drip and sinus pressure. Negative for ear pain, sinus pain, sore throat, trouble swallowing and voice change.    Neck: Negative for neck pain and neck swelling.   Cardiovascular:  Negative for chest pain.   Respiratory:  Positive for cough. Negative for shortness of breath, stridor and wheezing.    Gastrointestinal: Negative.    Musculoskeletal:  Positive for muscle ache.   Skin: Negative.  Negative for erythema.   Allergic/Immunologic: Negative.    Neurological:  Positive for headaches.   Psychiatric/Behavioral:  Negative for sleep disturbance.       Objective:     Physical Exam   Constitutional: She is oriented to person, place, and time. She appears well-developed. She is cooperative.  Non-toxic appearance. She appears ill.      Comments:Awake alert ambulatory nasally congested female "     HENT:   Head: Normocephalic.   Ears:   Right Ear: Hearing, tympanic membrane, external ear and ear canal normal.   Left Ear: Hearing, tympanic membrane, external ear and ear canal normal.   Nose: Congestion present. No mucosal edema, rhinorrhea or nasal deformity. No epistaxis. Right sinus exhibits no maxillary sinus tenderness and no frontal sinus tenderness. Left sinus exhibits no maxillary sinus tenderness and no frontal sinus tenderness.      Comments: Moderate  Mouth/Throat: Uvula is midline, oropharynx is clear and moist and mucous membranes are normal. Mucous membranes are moist. No trismus in the jaw. Normal dentition. No uvula swelling. No oropharyngeal exudate, posterior oropharyngeal edema or posterior oropharyngeal erythema.   Eyes: Conjunctivae and lids are normal. No scleral icterus.   Neck: Trachea normal and phonation normal. Neck supple. No edema present. No erythema present. No neck rigidity present.   Cardiovascular: Normal rate, regular rhythm, normal heart sounds and normal pulses.   No murmur heard.Exam reveals no gallop.   Pulmonary/Chest: Effort normal and breath sounds normal. No stridor. No respiratory distress. She has no decreased breath sounds. She has no wheezes. She has no rhonchi. She has no rales.   Musculoskeletal: Normal range of motion.         General: No swelling. Normal range of motion.      Cervical back: She exhibits no tenderness.   Lymphadenopathy:     She has no cervical adenopathy.   Neurological: no focal deficit. She is alert and oriented to person, place, and time. She exhibits normal muscle tone.   Skin: Skin is warm, dry, intact, not diaphoretic, not pale and no rash. No erythema   Psychiatric: Her speech is normal and behavior is normal. Mood, judgment and thought content normal.   Nursing note and vitals reviewed.       Previous History      Review of patient's allergies indicates:   Allergen Reactions    Phenergan plain      reaction       Past Medical  "History:   Diagnosis Date    Anxiety     Major depressive disorder      Current Outpatient Medications   Medication Instructions    EScitalopram oxalate (LEXAPRO) 20 MG tablet TAKE 1 TABLET BY MOUTH EVERY DAY    guaiFENesin-codeine 100-10 mg/5 ml (TUSSI-ORGANIDIN NR)  mg/5 mL syrup 10 mLs, Oral, Every 6 hours PRN    melatonin 10 mg, Oral, Nightly    multivitamin with folic acid 400 mcg Tab 1 tablet, Oral, Daily    oseltamivir (TAMIFLU) 75 mg, Oral, 2 times daily    predniSONE (DELTASONE) 10 mg, Oral, 2 times daily     Past Surgical History:   Procedure Laterality Date    AUGMENTATION OF BREAST Bilateral     WISDOM TOOTH EXTRACTION       Family History   Problem Relation Age of Onset    Acute lymphoblastic leukemia Mother     Hyperlipidemia Father     Hypertension Father     Diabetes Father     Heart attack Brother     Hypertension Brother        Social History     Tobacco Use    Smoking status: Every Day     Types: Cigarettes, Vaping with nicotine    Smokeless tobacco: Never   Substance Use Topics    Alcohol use: Yes    Drug use: Never        Physical Exam      Vital Signs Reviewed   /81   Pulse 82   Temp 99.2 °F (37.3 °C)   Resp 18   Ht 5' 4" (1.626 m)   Wt 57.2 kg (126 lb)   SpO2 98%   BMI 21.63 kg/m²        Procedures    Procedures     Labs     Results for orders placed or performed in visit on 09/01/23   POCT COVID-19 Rapid Screening   Result Value Ref Range    POC Rapid COVID Negative Negative     Acceptable Yes    POCT Influenza A/B Molecular   Result Value Ref Range    POC Molecular Influenza A Ag Positive (A) Negative, Not Reported    POC Molecular Influenza B Ag Negative Negative, Not Reported     Acceptable Yes    POCT Strep A, Molecular   Result Value Ref Range    Molecular Strep A, POC Negative Negative     Acceptable Yes          Assessment:     1. Congestion of nasal sinus    2. Influenza A        Plan:     Positive influenza testing " today.    Recommend Tamiflu to help reduce viral sick time.  Alternate Tylenol and ibuprofen every 4-6 hours if needed for aches pains fever or chills.  Recommend alternating daily decongestant antihistamine nasal spray if needed for upper respiratory symptoms.  May start prednisone in 1-2 days after steroid injection if congestion cough and inflammation persist.  Cheratussin sparingly lowest dose if needed for severe cough cold congestion body aches or rest.  Do not take narcotic cough medication and drive or operate machinery.  Recommend follow-up with primary care physician in 1 week for re-evaluation if not improving.  Congestion of nasal sinus  -     POCT COVID-19 Rapid Screening  -     POCT Influenza A/B Molecular  -     POCT Strep A, Molecular    Influenza A    Other orders  -     dexAMETHasone injection 10 mg  -     oseltamivir (TAMIFLU) 75 MG capsule; Take 1 capsule (75 mg total) by mouth 2 (two) times daily. for 5 days  Dispense: 10 capsule; Refill: 0  -     guaiFENesin-codeine 100-10 mg/5 ml (TUSSI-ORGANIDIN NR)  mg/5 mL syrup; Take 10 mLs by mouth every 6 (six) hours as needed for Congestion or Cough.  Dispense: 118 mL; Refill: 0  -     predniSONE (DELTASONE) 10 MG tablet; Take 1 tablet (10 mg total) by mouth 2 (two) times daily. for 5 days  Dispense: 10 tablet; Refill: 0

## 2023-09-01 NOTE — PATIENT INSTRUCTIONS
Positive influenza testing today.    Recommend Tamiflu to help reduce viral sick time.  Alternate Tylenol and ibuprofen every 4-6 hours if needed for aches pains fever or chills.  Recommend alternating daily decongestant antihistamine nasal spray if needed for upper respiratory symptoms.  May start prednisone in 1-2 days after steroid injection if congestion cough and inflammation persist.  Cheratussin sparingly lowest dose if needed for severe cough cold congestion body aches or rest.  Do not take narcotic cough medication and drive or operate machinery.  Recommend follow-up with primary care physician in 1 week for re-evaluation if not improving.

## 2023-12-22 ENCOUNTER — OFFICE VISIT (OUTPATIENT)
Dept: URGENT CARE | Facility: CLINIC | Age: 43
End: 2023-12-22
Payer: COMMERCIAL

## 2023-12-22 VITALS
HEART RATE: 75 BPM | SYSTOLIC BLOOD PRESSURE: 118 MMHG | RESPIRATION RATE: 18 BRPM | WEIGHT: 130 LBS | TEMPERATURE: 98 F | DIASTOLIC BLOOD PRESSURE: 79 MMHG | HEIGHT: 64 IN | OXYGEN SATURATION: 98 % | BODY MASS INDEX: 22.2 KG/M2

## 2023-12-22 DIAGNOSIS — J06.9 VIRAL URI: Primary | ICD-10-CM

## 2023-12-22 DIAGNOSIS — J02.9 SORE THROAT: ICD-10-CM

## 2023-12-22 LAB
CTP QC/QA: YES
MOLECULAR STREP A: NEGATIVE
POC MOLECULAR INFLUENZA A AGN: NEGATIVE
POC MOLECULAR INFLUENZA B AGN: NEGATIVE
SARS-COV-2 RDRP RESP QL NAA+PROBE: NEGATIVE

## 2023-12-22 PROCEDURE — 87651 STREP A DNA AMP PROBE: CPT | Mod: QW,,, | Performed by: NURSE PRACTITIONER

## 2023-12-22 PROCEDURE — 87651 POCT STREP A MOLECULAR: ICD-10-PCS | Mod: QW,,, | Performed by: NURSE PRACTITIONER

## 2023-12-22 PROCEDURE — 87502 POCT INFLUENZA A/B MOLECULAR: ICD-10-PCS | Mod: QW,,, | Performed by: NURSE PRACTITIONER

## 2023-12-22 PROCEDURE — 99213 PR OFFICE/OUTPT VISIT, EST, LEVL III, 20-29 MIN: ICD-10-PCS | Mod: ,,, | Performed by: NURSE PRACTITIONER

## 2023-12-22 PROCEDURE — 87502 INFLUENZA DNA AMP PROBE: CPT | Mod: QW,,, | Performed by: NURSE PRACTITIONER

## 2023-12-22 PROCEDURE — 99213 OFFICE O/P EST LOW 20 MIN: CPT | Mod: ,,, | Performed by: NURSE PRACTITIONER

## 2023-12-22 PROCEDURE — 87635 SARS-COV-2 COVID-19 AMP PRB: CPT | Mod: QW,,, | Performed by: NURSE PRACTITIONER

## 2023-12-22 PROCEDURE — 87635: ICD-10-PCS | Mod: QW,,, | Performed by: NURSE PRACTITIONER

## 2023-12-22 RX ORDER — PREDNISONE 20 MG/1
20 TABLET ORAL 2 TIMES DAILY
Qty: 6 TABLET | Refills: 0 | Status: SHIPPED | OUTPATIENT
Start: 2023-12-22 | End: 2023-12-25

## 2023-12-22 NOTE — PROGRESS NOTES
"Subjective:      Patient ID: Siri Cardoza is a 43 y.o. female.    Vitals:  height is 5' 4" (1.626 m) and weight is 59 kg (130 lb). Her temperature is 97.7 °F (36.5 °C). Her blood pressure is 118/79 and her pulse is 75. Her respiration is 18 and oxygen saturation is 98%.     Chief Complaint: Cough (Pt presents to clinic with cough. Sore throat, congestion, and fatigue. Symptomatic x 2 days.)    This is a 43-year-old female presents to urgent care with a 1 day history of postnasal drip sinus congestion sore throat and headache.  Denies any fever nausea vomiting or diarrhea.  She denies any productive cough but does have a nonproductive slight cough without wheezing or shortness a breath.        Constitution: Negative for fever.   HENT:  Positive for postnasal drip, sinus pressure and sore throat.       Objective:     Physical Exam   Constitutional: She is oriented to person, place, and time. She appears well-developed. She is cooperative.  Non-toxic appearance. She does not appear ill. No distress.   HENT:   Head: Normocephalic and atraumatic.   Ears:   Right Ear: Hearing, tympanic membrane, external ear and ear canal normal.   Left Ear: Hearing, tympanic membrane, external ear and ear canal normal.   Nose: Nose normal. No mucosal edema, rhinorrhea or nasal deformity. No epistaxis. Right sinus exhibits no maxillary sinus tenderness and no frontal sinus tenderness. Left sinus exhibits no maxillary sinus tenderness and no frontal sinus tenderness.   Mouth/Throat: Uvula is midline, oropharynx is clear and moist and mucous membranes are normal. No trismus in the jaw. Normal dentition. No uvula swelling. No oropharyngeal exudate, posterior oropharyngeal edema or posterior oropharyngeal erythema.   Eyes: Conjunctivae and lids are normal. No scleral icterus.   Neck: Trachea normal and phonation normal. Neck supple. No edema present. No erythema present. No neck rigidity present.   Cardiovascular: Normal rate, regular " "rhythm, normal heart sounds and normal pulses.   Pulmonary/Chest: Effort normal and breath sounds normal. No respiratory distress. She has no decreased breath sounds. She has no rhonchi.   Abdominal: Normal appearance.   Musculoskeletal: Normal range of motion.         General: No deformity. Normal range of motion.   Neurological: She is alert and oriented to person, place, and time. She exhibits normal muscle tone. Coordination normal.   Skin: Skin is warm, dry, intact, not diaphoretic and not pale.   Psychiatric: Her speech is normal and behavior is normal. Judgment and thought content normal.   Nursing note and vitals reviewed.         Previous History      Review of patient's allergies indicates:   Allergen Reactions    Phenergan plain      reaction       Past Medical History:   Diagnosis Date    Anxiety     Major depressive disorder      Current Outpatient Medications   Medication Instructions    EScitalopram oxalate (LEXAPRO) 20 MG tablet TAKE 1 TABLET BY MOUTH EVERY DAY    melatonin 10 mg, Oral, Nightly    multivitamin with folic acid 400 mcg Tab 1 tablet, Oral, Daily    predniSONE (DELTASONE) 20 mg, Oral, 2 times daily    pyrilamine-chlophedianoL 12.5-12.5 mg/5 mL Liqd 10 mLs, Oral, 3 times daily     Past Surgical History:   Procedure Laterality Date    AUGMENTATION OF BREAST Bilateral     WISDOM TOOTH EXTRACTION           Social History     Tobacco Use    Smoking status: Every Day     Types: Cigarettes, Vaping with nicotine    Smokeless tobacco: Never   Substance Use Topics    Alcohol use: Yes    Drug use: Never        Physical Exam      Vital Signs Reviewed   /79   Pulse 75   Temp 97.7 °F (36.5 °C)   Resp 18   Ht 5' 4" (1.626 m)   Wt 59 kg (130 lb)   LMP 12/19/2023   SpO2 98%   BMI 22.31 kg/m²        Procedures    Procedures     Labs     Results for orders placed or performed in visit on 12/22/23   POCT COVID-19 Rapid Screening   Result Value Ref Range    POC Rapid COVID Negative Negative    "  Acceptable Yes    POCT Strep A, Molecular   Result Value Ref Range    Molecular Strep A, POC Negative Negative     Acceptable Yes    POCT Influenza A/B Molecular   Result Value Ref Range    POC Molecular Influenza A Ag Negative Negative, Not Reported    POC Molecular Influenza B Ag Negative Negative, Not Reported     Acceptable Yes       Assessment:     1. Viral URI    2. Sore throat        Plan:   Steroid and ninjacof sent to pharmacy  Start taking an allergy pill daily such as claritin, zyrtec, allegrea or xyzal. Also start using a nasal steroid spray such as flonase or nasacort daily. If you are not being treated for high blood pressure, you can also take decongestant such as sudafed as needed. They can be purchased over the counter. If oral steroids were prescribed, start them tomorrow morning. Monitor for fever. Take tylenol/acetaminophen or ibuprofen as needed. Rest and hydrate. If symptoms persist or worsen, return to clinic or seek medical attention immediately.     Viral URI    Sore throat  -     POCT COVID-19 Rapid Screening  -     POCT Strep A, Molecular  -     POCT Influenza A/B Molecular    Other orders  -     pyrilamine-chlophedianoL 12.5-12.5 mg/5 mL Liqd; Take 10 mLs by mouth 3 (three) times daily. for 5 days  Dispense: 180 mL; Refill: 0  -     predniSONE (DELTASONE) 20 MG tablet; Take 1 tablet (20 mg total) by mouth 2 (two) times daily. for 3 days  Dispense: 6 tablet; Refill: 0

## 2024-02-15 ENCOUNTER — OFFICE VISIT (OUTPATIENT)
Dept: URGENT CARE | Facility: CLINIC | Age: 44
End: 2024-02-15
Payer: COMMERCIAL

## 2024-02-15 VITALS
SYSTOLIC BLOOD PRESSURE: 120 MMHG | DIASTOLIC BLOOD PRESSURE: 87 MMHG | HEIGHT: 65 IN | TEMPERATURE: 98 F | WEIGHT: 130 LBS | BODY MASS INDEX: 21.66 KG/M2 | OXYGEN SATURATION: 99 % | RESPIRATION RATE: 18 BRPM | HEART RATE: 89 BPM

## 2024-02-15 DIAGNOSIS — U07.1 COVID-19: Primary | ICD-10-CM

## 2024-02-15 DIAGNOSIS — J02.9 SORE THROAT: ICD-10-CM

## 2024-02-15 PROCEDURE — 87502 INFLUENZA DNA AMP PROBE: CPT | Mod: QW,,, | Performed by: FAMILY MEDICINE

## 2024-02-15 PROCEDURE — 99213 OFFICE O/P EST LOW 20 MIN: CPT | Mod: ,,, | Performed by: FAMILY MEDICINE

## 2024-02-15 PROCEDURE — 87651 STREP A DNA AMP PROBE: CPT | Mod: QW,,, | Performed by: FAMILY MEDICINE

## 2024-02-15 PROCEDURE — 87635 SARS-COV-2 COVID-19 AMP PRB: CPT | Mod: QW,,, | Performed by: FAMILY MEDICINE

## 2024-02-15 RX ORDER — BENZONATATE 200 MG/1
200 CAPSULE ORAL 3 TIMES DAILY PRN
Qty: 15 CAPSULE | Refills: 0 | Status: SHIPPED | OUTPATIENT
Start: 2024-02-15 | End: 2024-02-20

## 2024-02-15 NOTE — PROGRESS NOTES
"Subjective:      Patient ID: Siri Cardoza is a 43 y.o. female.    Vitals:  height is 5' 5" (1.651 m) and weight is 59 kg (130 lb). Her oral temperature is 98.4 °F (36.9 °C). Her blood pressure is 120/87 and her pulse is 89. Her respiration is 18 and oxygen saturation is 99%.     Chief Complaint: Cough (Cough, sinus congestion, sore throat, body aches x 1 day. )    HPI:  43-year-old female present to clinic with concerns of coughing, congestion body aches and sore throat started yesterday.  No measured fever.  Reviewed the vital signs appears stable with O2 sats 99%.  Patient is vaccinated for COVID-19.  States 1 of family members tested positive for COVID on Sunday  ROS :  Constitutional : _ feverish , Body aches, Chills  Eyes : _No redness, drainage or pain  HENT_sore throat, postnasal drainage  Respiratory_no wheezing, no shortness of breath  Cardiovascular_no chest pain  Gastrointestinal_ No vomiting, No diarrhea, No abdominal pain  Musculoskeletal_no joint pain, no joint swelling  Integumentary_no skin rash     Objective:     Physical Exam  General : Alert and Oriented, No apparent distress, afebrile, sounds stuffy and congested  Neck - supple  HENT : Oropharynx no redness or swelling. Tonsils not enlarged, no exudate, bilateral TMs intact mild fluid no redness.   Respiratory : Bilateral equal breath sounds, nonlabored respirations  Cardiovascular : Rate, rhythm regular, normal volume pulse, no murmur  Gastrointestinal: Full abdomen, soft, nontender to palpate  Integumentary : Warm, Dry and no rash    Assessment:     1. COVID-19    2. Sore throat      Plan:   COVID-19 test positive in the clinic today.  Discussed the condition and course in detail voiced understanding   Adequate hydration and rest. Monitor the symptoms closely  Recommendation is to follow a timeline quarantine measures per CDC and [facility]  Tylenol as needed for fever, body aches and headache. Antihistamine of choice for congestion. "   Robitussin-DM for cough and cold as needed and as directed. Can try vitamin C, zinc 50 mg, vitamin D3 5000 IU while in quarantine  At home quarantine instructions for 5 days since start of symptoms, no fever (100.4 and above) for 24 hours and with improved symptoms can stop home quarantine  Tessalon Perles for coughing as needed  Wear a mask, cover your cough and sneeze. Clean any shared surfaces  Work excuse rest of the week, can return to clinic on Monday meeting above criteria  Call or return to clinic for any questions. ER precautions with any acute change in symptoms. Follow up with primary MD  Flu test negative, strep test negative    COVID-19  -     benzonatate (TESSALON) 200 MG capsule; Take 1 capsule (200 mg total) by mouth 3 (three) times daily as needed for Cough.  Dispense: 15 capsule; Refill: 0    Sore throat  -     POCT COVID-19 Rapid Screening  -     POCT Influenza A/B MOLECULAR  -     POCT Strep A, Molecular

## 2024-02-15 NOTE — PATIENT INSTRUCTIONS
COVID-19 test positive in the clinic today.  Discussed the condition and course in detail voiced understanding   Adequate hydration and rest. Monitor the symptoms closely  Recommendation is to follow a timeline quarantine measures per CDC and [facility]  Tylenol as needed for fever, body aches and headache. Antihistamine of choice for congestion.   Robitussin-DM for cough and cold as needed and as directed. Can try vitamin C, zinc 50 mg, vitamin D3 5000 IU while in quarantine  At home quarantine instructions for 5 days since start of symptoms, no fever (100.4 and above) for 24 hours and with improved symptoms can stop home quarantine  Tessalon Perles for coughing as needed  Wear a mask, cover your cough and sneeze. Clean any shared surfaces  Work excuse rest of the week, can return to clinic on Monday meeting above criteria  Call or return to clinic for any questions. ER precautions with any acute change in symptoms. Follow up with primary MD  Flu test negative, strep test negative

## 2024-02-15 NOTE — LETTER
February 15, 2024      [facility]  [address] [address]  Phone: [phone]  Fax: [phone]       Patient: Siri Cardoza   YOB: 1980  Date of Visit: 02/15/2024    To Whom It May Concern:    Dameon Cardoza  was at [facility] on 02/15/2024. The patient may return to work/school on 02/18/2024 with no restrictions. If you have any questions or concerns, or if I can be of further assistance, please do not hesitate to contact me.    Sincerely,    Jennifer Min MA      Dr Petty

## 2024-02-19 ENCOUNTER — PATIENT MESSAGE (OUTPATIENT)
Dept: RESEARCH | Facility: HOSPITAL | Age: 44
End: 2024-02-19
Payer: COMMERCIAL

## 2024-04-10 DIAGNOSIS — F41.9 ANXIETY: ICD-10-CM

## 2024-04-10 RX ORDER — ESCITALOPRAM OXALATE 20 MG/1
TABLET ORAL
Qty: 30 TABLET | Refills: 5 | Status: SHIPPED | OUTPATIENT
Start: 2024-04-10

## 2024-07-08 DIAGNOSIS — Z13.1 DIABETES MELLITUS SCREENING: ICD-10-CM

## 2024-07-08 DIAGNOSIS — Z00.00 WELLNESS EXAMINATION: ICD-10-CM

## 2024-07-08 DIAGNOSIS — Z11.4 ENCOUNTER FOR SCREENING FOR HIV: ICD-10-CM

## 2024-07-08 DIAGNOSIS — Z11.59 NEED FOR HEPATITIS C SCREENING TEST: ICD-10-CM

## 2024-07-08 DIAGNOSIS — Z00.00 ROUTINE GENERAL MEDICAL EXAMINATION AT A HEALTH CARE FACILITY: Primary | ICD-10-CM

## 2024-07-23 ENCOUNTER — LAB VISIT (OUTPATIENT)
Dept: LAB | Facility: HOSPITAL | Age: 44
End: 2024-07-23
Attending: FAMILY MEDICINE
Payer: COMMERCIAL

## 2024-07-23 DIAGNOSIS — Z11.59 NEED FOR HEPATITIS C SCREENING TEST: ICD-10-CM

## 2024-07-23 DIAGNOSIS — Z00.00 WELLNESS EXAMINATION: ICD-10-CM

## 2024-07-23 DIAGNOSIS — Z13.1 DIABETES MELLITUS SCREENING: ICD-10-CM

## 2024-07-23 DIAGNOSIS — Z00.00 ROUTINE GENERAL MEDICAL EXAMINATION AT A HEALTH CARE FACILITY: ICD-10-CM

## 2024-07-23 DIAGNOSIS — Z11.4 ENCOUNTER FOR SCREENING FOR HIV: ICD-10-CM

## 2024-07-23 LAB
ALBUMIN SERPL-MCNC: 3.9 G/DL (ref 3.5–5)
ALBUMIN/GLOB SERPL: 1.4 RATIO (ref 1.1–2)
ALP SERPL-CCNC: 47 UNIT/L (ref 40–150)
ALT SERPL-CCNC: 14 UNIT/L (ref 0–55)
ANION GAP SERPL CALC-SCNC: 7 MEQ/L
AST SERPL-CCNC: 19 UNIT/L (ref 5–34)
BASOPHILS # BLD AUTO: 0.07 X10(3)/MCL
BASOPHILS NFR BLD AUTO: 1.4 %
BILIRUB SERPL-MCNC: 0.3 MG/DL
BUN SERPL-MCNC: 11 MG/DL (ref 7–18.7)
CALCIUM SERPL-MCNC: 9.5 MG/DL (ref 8.4–10.2)
CHLORIDE SERPL-SCNC: 107 MMOL/L (ref 98–107)
CHOLEST SERPL-MCNC: 192 MG/DL
CHOLEST/HDLC SERPL: 5 {RATIO} (ref 0–5)
CO2 SERPL-SCNC: 28 MMOL/L (ref 22–29)
CREAT SERPL-MCNC: 0.77 MG/DL (ref 0.55–1.02)
CREAT/UREA NIT SERPL: 14
EOSINOPHIL # BLD AUTO: 0.23 X10(3)/MCL (ref 0–0.9)
EOSINOPHIL NFR BLD AUTO: 4.5 %
ERYTHROCYTE [DISTWIDTH] IN BLOOD BY AUTOMATED COUNT: 13.4 % (ref 11.5–17)
EST. AVERAGE GLUCOSE BLD GHB EST-MCNC: 88.2 MG/DL
GFR SERPLBLD CREATININE-BSD FMLA CKD-EPI: >60 ML/MIN/1.73/M2
GLOBULIN SER-MCNC: 2.7 GM/DL (ref 2.4–3.5)
GLUCOSE SERPL-MCNC: 87 MG/DL (ref 74–100)
HBA1C MFR BLD: 4.7 %
HCT VFR BLD AUTO: 39.4 % (ref 37–47)
HCV AB SERPL QL IA: NONREACTIVE
HDLC SERPL-MCNC: 40 MG/DL (ref 35–60)
HGB BLD-MCNC: 12.7 G/DL (ref 12–16)
HIV 1+2 AB+HIV1 P24 AG SERPL QL IA: NONREACTIVE
IMM GRANULOCYTES # BLD AUTO: 0.01 X10(3)/MCL (ref 0–0.04)
IMM GRANULOCYTES NFR BLD AUTO: 0.2 %
LDLC SERPL CALC-MCNC: 130 MG/DL (ref 50–140)
LYMPHOCYTES # BLD AUTO: 2.15 X10(3)/MCL (ref 0.6–4.6)
LYMPHOCYTES NFR BLD AUTO: 41.8 %
MCH RBC QN AUTO: 30.4 PG (ref 27–31)
MCHC RBC AUTO-ENTMCNC: 32.2 G/DL (ref 33–36)
MCV RBC AUTO: 94.3 FL (ref 80–94)
MONOCYTES # BLD AUTO: 0.49 X10(3)/MCL (ref 0.1–1.3)
MONOCYTES NFR BLD AUTO: 9.5 %
NEUTROPHILS # BLD AUTO: 2.19 X10(3)/MCL (ref 2.1–9.2)
NEUTROPHILS NFR BLD AUTO: 42.6 %
NRBC BLD AUTO-RTO: 0 %
PLATELET # BLD AUTO: 218 X10(3)/MCL (ref 130–400)
PMV BLD AUTO: 12.3 FL (ref 7.4–10.4)
POTASSIUM SERPL-SCNC: 4.2 MMOL/L (ref 3.5–5.1)
PROT SERPL-MCNC: 6.6 GM/DL (ref 6.4–8.3)
RBC # BLD AUTO: 4.18 X10(6)/MCL (ref 4.2–5.4)
SODIUM SERPL-SCNC: 142 MMOL/L (ref 136–145)
TRIGL SERPL-MCNC: 109 MG/DL (ref 37–140)
TSH SERPL-ACNC: 4.74 UIU/ML (ref 0.35–4.94)
VLDLC SERPL CALC-MCNC: 22 MG/DL
WBC # BLD AUTO: 5.14 X10(3)/MCL (ref 4.5–11.5)

## 2024-07-23 PROCEDURE — 86803 HEPATITIS C AB TEST: CPT

## 2024-07-23 PROCEDURE — 83036 HEMOGLOBIN GLYCOSYLATED A1C: CPT

## 2024-07-23 PROCEDURE — 36415 COLL VENOUS BLD VENIPUNCTURE: CPT

## 2024-07-23 PROCEDURE — 80061 LIPID PANEL: CPT

## 2024-07-23 PROCEDURE — 80053 COMPREHEN METABOLIC PANEL: CPT

## 2024-07-23 PROCEDURE — 85025 COMPLETE CBC W/AUTO DIFF WBC: CPT

## 2024-07-23 PROCEDURE — 87389 HIV-1 AG W/HIV-1&-2 AB AG IA: CPT

## 2024-07-23 PROCEDURE — 84443 ASSAY THYROID STIM HORMONE: CPT

## 2024-07-24 ENCOUNTER — TELEPHONE (OUTPATIENT)
Dept: FAMILY MEDICINE | Facility: CLINIC | Age: 44
End: 2024-07-24
Payer: COMMERCIAL

## 2024-07-24 ENCOUNTER — OFFICE VISIT (OUTPATIENT)
Dept: FAMILY MEDICINE | Facility: CLINIC | Age: 44
End: 2024-07-24
Payer: COMMERCIAL

## 2024-07-24 VITALS
TEMPERATURE: 98 F | DIASTOLIC BLOOD PRESSURE: 78 MMHG | BODY MASS INDEX: 22.49 KG/M2 | WEIGHT: 135 LBS | OXYGEN SATURATION: 99 % | HEIGHT: 65 IN | RESPIRATION RATE: 18 BRPM | HEART RATE: 71 BPM | SYSTOLIC BLOOD PRESSURE: 115 MMHG

## 2024-07-24 DIAGNOSIS — G47.33 OBSTRUCTIVE SLEEP APNEA ON CPAP: ICD-10-CM

## 2024-07-24 DIAGNOSIS — Z12.31 BREAST CANCER SCREENING BY MAMMOGRAM: ICD-10-CM

## 2024-07-24 DIAGNOSIS — Z00.00 ROUTINE GENERAL MEDICAL EXAMINATION AT A HEALTH CARE FACILITY: Primary | ICD-10-CM

## 2024-07-24 DIAGNOSIS — F32.5 MAJOR DEPRESSIVE DISORDER, SINGLE EPISODE, IN FULL REMISSION: ICD-10-CM

## 2024-07-24 PROCEDURE — 99396 PREV VISIT EST AGE 40-64: CPT | Mod: ,,, | Performed by: FAMILY MEDICINE

## 2024-07-24 PROCEDURE — 3078F DIAST BP <80 MM HG: CPT | Mod: CPTII,,, | Performed by: FAMILY MEDICINE

## 2024-07-24 PROCEDURE — 3044F HG A1C LEVEL LT 7.0%: CPT | Mod: CPTII,,, | Performed by: FAMILY MEDICINE

## 2024-07-24 PROCEDURE — 3008F BODY MASS INDEX DOCD: CPT | Mod: CPTII,,, | Performed by: FAMILY MEDICINE

## 2024-07-24 PROCEDURE — 1160F RVW MEDS BY RX/DR IN RCRD: CPT | Mod: CPTII,,, | Performed by: FAMILY MEDICINE

## 2024-07-24 PROCEDURE — 1159F MED LIST DOCD IN RCRD: CPT | Mod: CPTII,,, | Performed by: FAMILY MEDICINE

## 2024-07-24 PROCEDURE — 3074F SYST BP LT 130 MM HG: CPT | Mod: CPTII,,, | Performed by: FAMILY MEDICINE

## 2024-07-24 NOTE — PROGRESS NOTES
Patient ID: [id number]     Chief Complaint: wellness    HPI:     Siri Cardoza is a 43 y.o. female here today for an annual wellness visit. Reviewed and discussed lab results.  Overall she feels well. No other complaints today. Reviewed recent labs with patient. Diagnosed with sleep apnea and started on CPAP.     -------------------------------------    Anxiety    Major depressive disorder    Sleep apnea, unspecified        Past Surgical History:   Procedure Laterality Date    AUGMENTATION OF BREAST Bilateral     TUBAL LIGATION Bilateral 03/22/2024    WISDOM TOOTH EXTRACTION         Review of patient's allergies indicates:   Allergen Reactions    Phenergan plain      reaction       Outpatient Medications Marked as Taking for the 7/24/24 encounter (Office Visit) with Sincere Rehman, DO   Medication Sig Dispense Refill    EScitalopram oxalate (LEXAPRO) 20 MG tablet TAKE 1 TABLET BY MOUTH EVERY DAY 30 tablet 5       Social History     Socioeconomic History    Marital status: Unknown   Tobacco Use    Smoking status: Every Day     Types: Vaping with nicotine    Smokeless tobacco: Never   Substance and Sexual Activity    Alcohol use: Yes    Drug use: Never    Sexual activity: Yes     Social Determinants of Health     Financial Resource Strain: Low Risk  (7/17/2024)    Overall Financial Resource Strain (CARDIA)     Difficulty of Paying Living Expenses: Not hard at all   Food Insecurity: No Food Insecurity (7/17/2024)    Hunger Vital Sign     Worried About Running Out of Food in the Last Year: Never true     Ran Out of Food in the Last Year: Never true   Transportation Needs: No Transportation Needs (7/18/2023)    PRAPARE - Transportation     Lack of Transportation (Medical): No     Lack of Transportation (Non-Medical): No   Physical Activity: Insufficiently Active (7/17/2024)    Exercise Vital Sign     Days of Exercise per Week: 3 days     Minutes of Exercise per Session: 40 min   Stress: No Stress Concern Present  "(7/17/2024)    [employer] - Occupational Stress Questionnaire     Feeling of Stress : Not at all   Housing Stability: Low Risk  (7/18/2023)    Housing Stability Vital Sign     Unable to Pay for Housing in the Last Year: No     Number of Places Lived in the Last Year: 1     Unstable Housing in the Last Year: No        Family History   Problem Relation Name Age of Onset    Acute lymphoblastic leukemia Mother      Hyperlipidemia Father      Hypertension Father      Diabetes Father      Heart attack Brother      Hypertension Brother          Patient Care Team:  Sincere Rehman DO as PCP - General (Family Medicine)       Subjective:     Review of Systems   Constitutional:  Negative for chills and fever.   Respiratory:  Negative for shortness of breath.    Cardiovascular:  Negative for chest pain.   Gastrointestinal:  Negative for constipation and diarrhea.   Neurological:  Negative for dizziness and headaches.   Psychiatric/Behavioral:  The patient does not have insomnia.      See HPI for details    All Other ROS: Negative except as stated in HPI.     Objective:     /78 (BP Location: Left arm, Patient Position: Sitting, BP Method: Large (Manual))   Pulse 71   Temp 98.2 °F (36.8 °C)   Resp 18   Ht 5' 5" (1.651 m)   Wt 61.2 kg (135 lb)   LMP 07/23/2024 (Exact Date)   SpO2 99%   BMI 22.47 kg/m²     Physical Exam  Vitals reviewed.   Constitutional:       General: She is not in acute distress.     Appearance: Normal appearance.   Cardiovascular:      Rate and Rhythm: Normal rate and regular rhythm.      Heart sounds: No murmur heard.     No friction rub. No gallop.   Pulmonary:      Effort: No respiratory distress.      Breath sounds: No wheezing, rhonchi or rales.   Musculoskeletal:         General: No swelling, tenderness or deformity.      Right lower leg: No edema.      Left lower leg: No edema.   Skin:     General: Skin is warm and dry.      Findings: No lesion or rash. "   Neurological:      General: No focal deficit present.      Mental Status: She is alert.   Psychiatric:         Mood and Affect: Mood normal.         Assessment:       ICD-10-CM ICD-9-CM   1. Routine general medical examination at a health care facility  Z00.00 V70.0   2. Breast cancer screening by mammogram  Z12.31 V76.12   3. Major depressive disorder, single episode, in full remission  F32.5 296.26   4. Obstructive sleep apnea on CPAP  G47.33 327.23        Plan:     Health Maintenance Topics with due status: Not Due       Topic Last Completion Date    TETANUS VACCINE 07/14/2017    Influenza Vaccine 10/20/2023    Cervical Cancer Screening 02/26/2024      Vaccinations -   Immunization History   Administered Date(s) Administered    COVID-19, MRNA, LN-S, PF (Pfizer) (Purple Cap) 07/29/2021, 08/18/2021    DTP 02/14/1983, 04/18/1983, 06/20/1983, 03/19/1984, 04/07/1988    Influenza - Quadrivalent - PF *Preferred* (6 months and older) 09/25/2019, 09/16/2020, 12/16/2021, 11/16/2022    MMR 03/19/1984    OPV 02/14/1983, 04/18/1983, 06/20/1983, 03/19/1984, 04/07/1988    Tdap 07/14/2017      1. Routine general medical examination at a health care facility    2. Breast cancer screening by mammogram  - Mammo Digital Screening Bilat w/ [place]; Future    3. Major depressive disorder, single episode, in full remission  -well controlled on lexapro 20mg daily. Will follow up in 6 months to see if patient may be ready to try lowering the dose to 10mg daily.   4. Obstructive sleep apnea on CPAP  -Compliant with CPAP    Medication List with Changes/Refills   Current Medications    ESCITALOPRAM OXALATE (LEXAPRO) 20 MG TABLET    TAKE 1 TABLET BY MOUTH EVERY DAY       Start Date: 4/10/2024 End Date: --   Discontinued Medications    MELATONIN 10 MG TAB    Take 10 mg by mouth every evening.       Start Date: --        End Date: 7/24/2024    MULTIVITAMIN WITH FOLIC ACID 400 MCG TAB    Take 1 tablet by mouth once daily.       Start Date: --         End Date: 7/24/2024      The patient's Health Maintenance was reviewed and the following appears to be due at this time:   Health Maintenance Due   Topic Date Due    Pneumococcal Vaccines (Age 0-64) (1 of 2 - PCV) Never done    COVID-19 Vaccine (3 - 2023-24 season) 09/01/2023    Mammogram  08/11/2024     Follow up in about 6 months (around 1/24/2025) for Follow up for depression to discuss decreasing dose. . In addition to their scheduled follow up, the patient has also been instructed to follow up on as needed basis.

## 2024-07-24 NOTE — TELEPHONE ENCOUNTER
----- Message from Reina Hsu NP sent at 7/23/2024  3:10 PM CDT -----  Please inform patient of lab results.     1. Lipid panel overall has increased, but still within normal ranges. Diet modifications.     2. All labs within normal ranges.     Thanks for all you do,   Reina

## 2024-08-21 ENCOUNTER — HOSPITAL ENCOUNTER (OUTPATIENT)
Dept: RADIOLOGY | Facility: HOSPITAL | Age: 44
Discharge: HOME | End: 2024-08-21
Attending: FAMILY MEDICINE
Payer: COMMERCIAL

## 2024-08-21 DIAGNOSIS — Z12.31 BREAST CANCER SCREENING BY MAMMOGRAM: ICD-10-CM

## 2024-08-21 PROCEDURE — 77067 SCR MAMMO BI INCL CAD: CPT | Mod: TC

## 2024-08-21 PROCEDURE — 77063 BREAST TOMOSYNTHESIS BI: CPT | Mod: 26,,, | Performed by: STUDENT IN AN ORGANIZED HEALTH CARE EDUCATION/TRAINING PROGRAM

## 2024-08-21 PROCEDURE — 77067 SCR MAMMO BI INCL CAD: CPT | Mod: 26,,, | Performed by: STUDENT IN AN ORGANIZED HEALTH CARE EDUCATION/TRAINING PROGRAM

## 2024-08-22 ENCOUNTER — TELEPHONE (OUTPATIENT)
Dept: FAMILY MEDICINE | Facility: CLINIC | Age: 44
End: 2024-08-22
Payer: COMMERCIAL

## 2024-08-22 NOTE — TELEPHONE ENCOUNTER
----- Message from Reina Hsu NP sent at 8/22/2024 12:27 PM CDT -----  Normal MMG. Repeat in 1 year.

## 2024-11-03 DIAGNOSIS — F41.9 ANXIETY: ICD-10-CM

## 2024-11-04 RX ORDER — ESCITALOPRAM OXALATE 20 MG/1
TABLET ORAL
Qty: 30 TABLET | Refills: 5 | Status: SHIPPED | OUTPATIENT
Start: 2024-11-04

## 2025-01-24 ENCOUNTER — OFFICE VISIT (OUTPATIENT)
Dept: FAMILY MEDICINE | Facility: CLINIC | Age: 45
End: 2025-01-24
Payer: COMMERCIAL

## 2025-01-24 DIAGNOSIS — F32.5 MAJOR DEPRESSIVE DISORDER, SINGLE EPISODE, IN FULL REMISSION: Primary | ICD-10-CM

## 2025-01-24 RX ORDER — ESCITALOPRAM OXALATE 10 MG/1
10 TABLET ORAL DAILY
Qty: 30 TABLET | Refills: 11 | Status: SHIPPED | OUTPATIENT
Start: 2025-01-24 | End: 2026-01-24

## 2025-01-24 NOTE — PROGRESS NOTES
Patient ID: [id number]     Chief Complaint: Depression (6 month follow up)      HPI:     This is a telemedicine note. Patient was treated using telemedicine, real time audio and video, according to [facility] protocols. Sincere HARKINS DO, conducted the visit from the [facility]. The patient participated in the visit at a non-[facility] location selected by the patient, identified below. I am licensed in the state where the patient stated they are located. The patient stated that they understood and accepted the privacy and security risks to their information at their location. This visit is not recorded.    Patient was located at the patient's home.       Siri Cardoza is a 44 y.o. female here today for a telemedicine visit.  Ready to begin taper down of lexapro from 20mg dose.         -------------------------------------    Anxiety    Major depressive disorder    Sleep apnea, unspecified        Past Surgical History:   Procedure Laterality Date    AUGMENTATION OF BREAST Bilateral     TUBAL LIGATION Bilateral 03/22/2024    WISDOM TOOTH EXTRACTION         Review of patient's allergies indicates:   Allergen Reactions    Phenergan plain      reaction       Outpatient Medications Marked as Taking for the 1/24/25 encounter (Office Visit) with Sincere Rehman DO   Medication Sig Dispense Refill    [DISCONTINUED] EScitalopram oxalate (LEXAPRO) 20 MG tablet TAKE 1 TABLET BY MOUTH EVERY DAY 30 tablet 5       Social History     Socioeconomic History    Marital status: Unknown   Tobacco Use    Smoking status: Every Day     Types: Vaping with nicotine    Smokeless tobacco: Never   Substance and Sexual Activity    Alcohol use: Yes    Drug use: Never    Sexual activity: Yes     Partners: Male     Birth control/protection: See Surgical Hx     Social Drivers of Health     Financial Resource Strain: Low Risk  (7/17/2024)    Overall Financial Resource Strain (CARDIA)     Difficulty of Paying Living Expenses: Not  hard at all   Food Insecurity: No Food Insecurity (7/17/2024)    Hunger Vital Sign     Worried About Running Out of Food in the Last Year: Never true     Ran Out of Food in the Last Year: Never true   Transportation Needs: No Transportation Needs (7/18/2023)    PRAPARE - Transportation     Lack of Transportation (Medical): No     Lack of Transportation (Non-Medical): No   Physical Activity: Insufficiently Active (7/17/2024)    Exercise Vital Sign     Days of Exercise per Week: 3 days     Minutes of Exercise per Session: 40 min   Stress: No Stress Concern Present (7/17/2024)    [employer] - Occupational Stress Questionnaire     Feeling of Stress : Not at all   Housing Stability: Low Risk  (7/18/2023)    Housing Stability Vital Sign     Unable to Pay for Housing in the Last Year: No     Number of Places Lived in the Last Year: 1     Unstable Housing in the Last Year: No        Family History   Problem Relation Name Age of Onset    Acute lymphoblastic leukemia Mother AML     Cancer Mother AML     Hyperlipidemia Father Taurus     Hypertension Father Taurus     Diabetes Father Taurus     Heart attack Brother Mike     Hypertension Brother Mike         Patient Care Team:  Sincere Rehman DO as PCP - General (Family Medicine)      Subjective:       Review of Systems   HENT:  Negative for hearing loss.    Eyes:  Negative for discharge.   Respiratory:  Negative for wheezing.    Cardiovascular:  Negative for chest pain and palpitations.   Gastrointestinal:  Negative for blood in stool, constipation, diarrhea and vomiting.   Genitourinary:  Negative for dysuria and hematuria.   Musculoskeletal:  Negative for neck pain.   Neurological:  Negative for weakness and headaches.   Endo/Heme/Allergies:  Negative for polydipsia.       See HPI for details      All Other ROS: Negative except as stated in HPI.       Objective:     LMP 01/23/2025 (Exact Date)     Physical Exam    Physical Exam: LIMITED  DUE TO TELEMEDICINE RESTRICTIONS.  General: Alert and oriented, No acute distress.  Head: Normocephalic, Atraumatic.  Eye: Sclera non-icteric.  Neck/Thyroid:  Full range of motion.  Respiratory: Non-labored respirations, Symmetrical chest wall expansion.  Musculoskeletal: Normal range of motion.  Integumentary:  No visible suspicious lesions or rashes. No diaphoresis.   Neurologic: No focal deficits  Psychiatric: Normal interaction, Coherent speech, Euthymic mood, Appropriate affect       Assessment:       ICD-10-CM ICD-9-CM   1. Major depressive disorder, single episode, in full remission  F32.5 296.26        Plan:     1. Major depressive disorder, single episode, in full remission  - EScitalopram oxalate (LEXAPRO) 10 MG tablet; Take 1 tablet (10 mg total) by mouth once daily.  Dispense: 30 tablet; Refill: 11      Will taper down dose to 10mg for 3 months followed by every other day for 2-3 weeks before trying to stop the medication. If needed, can take every 3rd day for 2-3 weeks if needed.       Medication List with Changes/Refills   New Medications    ESCITALOPRAM OXALATE (LEXAPRO) 10 MG TABLET    Take 1 tablet (10 mg total) by mouth once daily.       Start Date: 1/24/2025 End Date: 1/24/2026   Discontinued Medications    ESCITALOPRAM OXALATE (LEXAPRO) 20 MG TABLET    TAKE 1 TABLET BY MOUTH EVERY DAY       Start Date: 11/4/2024 End Date: 1/24/2025      Follow up in about 6 months (around 7/24/2025) for Wellness. In addition to their scheduled follow up, the patient has also been instructed to follow up on as needed basis.       Video Time Documentation:  Spent 10 minutes with patient face to face discussed health concerns. More than 50% of this time was spent in counseling and coordination of care.    Answers submitted by the patient for this visit:  Review of Systems Questionnaire (Submitted on 1/22/2025)  activity change: No  unexpected weight change: No  rhinorrhea: No  trouble swallowing: No  visual  disturbance: No  chest tightness: No  polyuria: No  difficulty urinating: No  menstrual problem: No  joint swelling: No  arthralgias: No  confusion: No  dysphoric mood: No

## 2025-06-23 ENCOUNTER — OFFICE VISIT (OUTPATIENT)
Dept: URGENT CARE | Facility: CLINIC | Age: 45
End: 2025-06-23
Payer: COMMERCIAL

## 2025-06-23 VITALS
OXYGEN SATURATION: 100 % | WEIGHT: 135 LBS | DIASTOLIC BLOOD PRESSURE: 86 MMHG | HEART RATE: 63 BPM | SYSTOLIC BLOOD PRESSURE: 123 MMHG | BODY MASS INDEX: 22.49 KG/M2 | HEIGHT: 65 IN | TEMPERATURE: 99 F | RESPIRATION RATE: 18 BRPM

## 2025-06-23 DIAGNOSIS — M25.512 LEFT SHOULDER PAIN, UNSPECIFIED CHRONICITY: Primary | ICD-10-CM

## 2025-06-23 PROCEDURE — 99213 OFFICE O/P EST LOW 20 MIN: CPT | Mod: ,,, | Performed by: NURSE PRACTITIONER

## 2025-06-23 RX ORDER — CYCLOBENZAPRINE HCL 10 MG
10 TABLET ORAL 3 TIMES DAILY PRN
Qty: 21 TABLET | Refills: 0 | Status: SHIPPED | OUTPATIENT
Start: 2025-06-23 | End: 2025-06-30

## 2025-06-23 NOTE — PROGRESS NOTES
"Subjective:      Patient ID: Siri Cardoza is a 44 y.o. female.    Vitals:  height is 5' 5" (1.651 m) and weight is 61.2 kg (135 lb). Her oral temperature is 98.6 °F (37 °C). Her blood pressure is 123/86 and her pulse is 63. Her respiration is 18 and oxygen saturation is 100%.     Chief Complaint: Shoulder Pain (left)     Patient is a 44 y.o. female who presents to urgent care with complaints of LT shoulder pain x yesterday . Alleviating factors include ibuprofen with no relief. Patient denies injury or trauma.     Shoulder Pain     ROS   Objective:     Physical Exam   Constitutional: She is oriented to person, place, and time. She appears well-developed. She is cooperative.  Non-toxic appearance. She does not appear ill. No distress.   HENT:   Head: Normocephalic and atraumatic.   Ears:   Right Ear: Hearing, tympanic membrane, external ear and ear canal normal.   Left Ear: Hearing, tympanic membrane, external ear and ear canal normal.   Nose: Nose normal. No mucosal edema, rhinorrhea or nasal deformity. No epistaxis. Right sinus exhibits no maxillary sinus tenderness and no frontal sinus tenderness. Left sinus exhibits no maxillary sinus tenderness and no frontal sinus tenderness.   Mouth/Throat: Uvula is midline, oropharynx is clear and moist and mucous membranes are normal. No trismus in the jaw. Normal dentition. No uvula swelling. No oropharyngeal exudate, posterior oropharyngeal edema or posterior oropharyngeal erythema.   Eyes: Conjunctivae and lids are normal. No scleral icterus.   Neck: Trachea normal and phonation normal. Neck supple. No edema present. No erythema present. No neck rigidity present.   Cardiovascular: Normal rate, regular rhythm, normal heart sounds and normal pulses.   Pulmonary/Chest: Effort normal and breath sounds normal. No respiratory distress. She has no decreased breath sounds. She has no rhonchi.   Abdominal: Normal appearance.   Musculoskeletal: Normal range of motion.         " General: No deformity. Normal range of motion.        Arms:       Comments: Mild tenderness upon palpation  Full range of motion noted a left shoulder without any TTP over the posterior medial or aspect of the joint itself   Neurological: She is alert and oriented to person, place, and time. She exhibits normal muscle tone. Coordination normal.   Skin: Skin is warm, dry, intact, not diaphoretic and not pale.   Psychiatric: Her speech is normal and behavior is normal. Judgment and thought content normal.   Nursing note and vitals reviewed.      Assessment:     1. Left shoulder pain, unspecified chronicity        Plan:   Discussed treating as a pinched nerve or muscle strain with over-the-counter ibuprofen Flexeril heat massaging and continue to stretch the affected area as much as tolerated    Left shoulder pain, unspecified chronicity    Other orders  -     cyclobenzaprine (FLEXERIL) 10 MG tablet; Take 1 tablet (10 mg total) by mouth 3 (three) times daily as needed for Muscle spasms.  Dispense: 21 tablet; Refill: 0

## 2025-07-23 ENCOUNTER — OFFICE VISIT (OUTPATIENT)
Dept: URGENT CARE | Facility: CLINIC | Age: 45
End: 2025-07-23
Payer: COMMERCIAL

## 2025-07-23 VITALS
DIASTOLIC BLOOD PRESSURE: 78 MMHG | TEMPERATURE: 99 F | HEART RATE: 80 BPM | BODY MASS INDEX: 22.49 KG/M2 | OXYGEN SATURATION: 97 % | SYSTOLIC BLOOD PRESSURE: 114 MMHG | HEIGHT: 65 IN | WEIGHT: 135 LBS | RESPIRATION RATE: 18 BRPM

## 2025-07-23 DIAGNOSIS — R30.0 BURNING WITH URINATION: Primary | ICD-10-CM

## 2025-07-23 LAB
BILIRUB UR QL STRIP: NEGATIVE
GLUCOSE UR QL STRIP: NEGATIVE
KETONES UR QL STRIP: NEGATIVE
LEUKOCYTE ESTERASE UR QL STRIP: NEGATIVE
PH, POC UA: 7
POC BLOOD, URINE: NEGATIVE
POC NITRATES, URINE: NEGATIVE
PROT UR QL STRIP: NEGATIVE
SP GR UR STRIP: 1 (ref 1–1.03)
UROBILINOGEN UR STRIP-ACNC: NORMAL (ref 0.1–1.1)

## 2025-07-23 PROCEDURE — 81003 URINALYSIS AUTO W/O SCOPE: CPT | Mod: QW,,, | Performed by: CLINIC/CENTER

## 2025-07-23 PROCEDURE — 99213 OFFICE O/P EST LOW 20 MIN: CPT | Mod: ,,, | Performed by: CLINIC/CENTER

## 2025-07-23 PROCEDURE — 87086 URINE CULTURE/COLONY COUNT: CPT | Performed by: CLINIC/CENTER

## 2025-07-23 RX ORDER — NITROFURANTOIN 25; 75 MG/1; MG/1
100 CAPSULE ORAL 2 TIMES DAILY
Qty: 10 CAPSULE | Refills: 0 | Status: SHIPPED | OUTPATIENT
Start: 2025-07-23 | End: 2025-07-28

## 2025-07-23 NOTE — PROGRESS NOTES
"Subjective:      Patient ID: Siri Cardoza is a 44 y.o. female.    Vitals:  height is 5' 5" (1.651 m) and weight is 61.2 kg (135 lb). Her tympanic temperature is 98.7 °F (37.1 °C). Her blood pressure is 114/78 and her pulse is 80. Her respiration is 18 and oxygen saturation is 97%.     Chief Complaint: Urinary Tract Infection     Patient is a 44 y.o. female who presents to urgent care with complaints of burning with urination. Started yesterday.  Patient denies fever, abdominal pain, flank pain, back pain    Urinary Tract Infection   Associated symptoms include frequency.       Genitourinary:  Positive for dysuria and frequency.      Objective:     Physical Exam   Constitutional: She is oriented to person, place, and time. She appears well-developed. She is cooperative.   HENT:   Head: Normocephalic and atraumatic.   Ears:   Right Ear: Hearing and external ear normal.   Left Ear: Hearing and external ear normal.   Nose: Nose normal. No mucosal edema or nasal deformity. No epistaxis. Right sinus exhibits no maxillary sinus tenderness and no frontal sinus tenderness. Left sinus exhibits no maxillary sinus tenderness and no frontal sinus tenderness.   Mouth/Throat: Uvula is midline, oropharynx is clear and moist and mucous membranes are normal. No trismus in the jaw. Normal dentition. No uvula swelling.   Eyes: Conjunctivae and lids are normal.   Neck: Trachea normal and phonation normal. Neck supple.   Cardiovascular: Normal rate, regular rhythm, normal heart sounds and normal pulses.   Pulmonary/Chest: Effort normal and breath sounds normal.   Abdominal: Normal appearance and bowel sounds are normal. She exhibits no distension and no mass. Soft. There is no abdominal tenderness. There is no rebound, no guarding, no left CVA tenderness and no right CVA tenderness. No hernia.   Musculoskeletal: Normal range of motion.         General: Normal range of motion.   Neurological: She is alert and oriented to person, " "place, and time. She exhibits normal muscle tone.   Skin: Skin is warm, dry and intact.   Psychiatric: Her speech is normal and behavior is normal. Judgment and thought content normal.   Nursing note and vitals reviewed.         Previous History      Review of patient's allergies indicates:   Allergen Reactions    Phenergan plain      reaction       Past Medical History:   Diagnosis Date    Anxiety     Major depressive disorder     Sleep apnea, unspecified 06/2024     Current Outpatient Medications   Medication Instructions    EScitalopram oxalate (LEXAPRO) 10 mg, Oral, Daily    nitrofurantoin, macrocrystal-monohydrate, (MACROBID) 100 MG capsule 100 mg, Oral, 2 times daily     Past Surgical History:   Procedure Laterality Date    AUGMENTATION OF BREAST Bilateral     TUBAL LIGATION Bilateral 03/22/2024    WISDOM TOOTH EXTRACTION       Family History   Problem Relation Name Age of Onset    Acute lymphoblastic leukemia Mother AML     Cancer Mother AML     Hyperlipidemia Father Taurus     Hypertension Father Taurus     Diabetes Father Taurus     Heart attack Brother Mike     Hypertension Brother Mike        Social History[1]     Physical Exam      Vital Signs Reviewed   /78 (Patient Position: Sitting)   Pulse 80   Temp 98.7 °F (37.1 °C) (Tympanic)   Resp 18   Ht 5' 5" (1.651 m)   Wt 61.2 kg (135 lb)   LMP 07/09/2025   SpO2 97%   BMI 22.47 kg/m²        Procedures    Procedures     Labs     Results for orders placed or performed in visit on 07/23/25   POCT Urinalysis, Dipstick, Automated, W/O Scope    Collection Time: 07/23/25  5:18 PM   Result Value Ref Range    POC Blood, Urine Negative Negative    POC Bilirubin, Urine Negative Negative    POC Urobilinogen, Urine NORMAL 0.1 - 1.1    POC Ketones, Urine Negative Negative    POC Protein, Urine Negative Negative    POC Nitrates, Urine Negative Negative    POC Glucose, Urine Negative Negative    pH, UA 7     POC Specific Gravity, Urine 1.005 1.003 - 1.029 "    POC Leukocytes, Urine Negative Negative      Assessment:     1. Burning with urination      Patient's physical exam is benign  Plan:   Your symptoms are suspicious for urinary tract infection.  Stat we perform here is negative for any signs of UTI.  We will treat you with a 5 day course of antibiotics and culture your urine. If your symptoms have not improved by Monday, call  the clinic for a urology referral.     Complete full course of antibiotics.      Drink plenty of water daily. Avoid soda.    Follow up with your primary care doctor as needed.    Present to the nearest Emergency Department with any significant change or worsening of symptoms including but not limited to blood in urine, nausea/vomiting, abdominal pain, fever, body aches, chills, or flank pain.      Burning with urination  -     POCT Urinalysis, Dipstick, Automated, W/O Scope  -     nitrofurantoin, macrocrystal-monohydrate, (MACROBID) 100 MG capsule; Take 1 capsule (100 mg total) by mouth 2 (two) times daily. for 5 days  Dispense: 10 capsule; Refill: 0                         [1]   Social History  Tobacco Use    Smoking status: Former     Types: Cigarettes    Smokeless tobacco: Never   Substance Use Topics    Alcohol use: Not Currently    Drug use: Never

## 2025-07-23 NOTE — PATIENT INSTRUCTIONS
Your symptoms are suspicious for urinary tract infection.  Stat we perform here is negative for any signs of UTI.  We will treat you with a 5 day course of antibiotics and culture your urine. If your symptoms have not improved by Monday, call  the clinic for a urology referral.     Complete full course of antibiotics.      Drink plenty of water daily. Avoid soda.    Follow up with your primary care doctor as needed.    Present to the nearest Emergency Department with any significant change or worsening of symptoms including but not limited to blood in urine, nausea/vomiting, abdominal pain, fever, body aches, chills, or flank pain.

## 2025-07-25 LAB — BACTERIA UR CULT: NO GROWTH

## 2025-07-31 ENCOUNTER — PATIENT MESSAGE (OUTPATIENT)
Dept: FAMILY MEDICINE | Facility: CLINIC | Age: 45
End: 2025-07-31
Payer: COMMERCIAL

## 2025-08-06 ENCOUNTER — OFFICE VISIT (OUTPATIENT)
Dept: FAMILY MEDICINE | Facility: CLINIC | Age: 45
End: 2025-08-06
Payer: COMMERCIAL

## 2025-08-06 VITALS
DIASTOLIC BLOOD PRESSURE: 80 MMHG | RESPIRATION RATE: 20 BRPM | BODY MASS INDEX: 24.76 KG/M2 | WEIGHT: 148.63 LBS | HEIGHT: 65 IN | HEART RATE: 78 BPM | OXYGEN SATURATION: 98 % | SYSTOLIC BLOOD PRESSURE: 115 MMHG | TEMPERATURE: 98 F

## 2025-08-06 DIAGNOSIS — F32.5 MAJOR DEPRESSIVE DISORDER, SINGLE EPISODE, IN FULL REMISSION: Primary | ICD-10-CM

## 2025-08-06 PROCEDURE — 99213 OFFICE O/P EST LOW 20 MIN: CPT | Mod: ,,, | Performed by: FAMILY MEDICINE

## 2025-08-06 PROCEDURE — 1159F MED LIST DOCD IN RCRD: CPT | Mod: CPTII,,, | Performed by: FAMILY MEDICINE

## 2025-08-06 PROCEDURE — 3079F DIAST BP 80-89 MM HG: CPT | Mod: CPTII,,, | Performed by: FAMILY MEDICINE

## 2025-08-06 PROCEDURE — 1160F RVW MEDS BY RX/DR IN RCRD: CPT | Mod: CPTII,,, | Performed by: FAMILY MEDICINE

## 2025-08-06 PROCEDURE — 3074F SYST BP LT 130 MM HG: CPT | Mod: CPTII,,, | Performed by: FAMILY MEDICINE

## 2025-08-06 PROCEDURE — 3008F BODY MASS INDEX DOCD: CPT | Mod: CPTII,,, | Performed by: FAMILY MEDICINE

## 2025-08-06 RX ORDER — ESCITALOPRAM OXALATE 20 MG/1
20 TABLET ORAL DAILY
Qty: 90 TABLET | Refills: 3 | Status: SHIPPED | OUTPATIENT
Start: 2025-08-06 | End: 2026-08-06

## 2025-08-06 NOTE — PROGRESS NOTES
"   Patient ID: [id number]     Chief Complaint: Anxiety (Since decreasing Lexapro has had irritability and thinks she needs to be back on 20 mg)    HPI:     Siri Cardoza is a 44 y.o. female here today for Anxiety (Since decreasing Lexapro has had irritability and thinks she needs to be back on 20 mg).   History of Present Illness               -------------------------------------    Anxiety    Major depressive disorder    Sleep apnea, unspecified        Past Surgical History:   Procedure Laterality Date    AUGMENTATION OF BREAST Bilateral     TUBAL LIGATION Bilateral 03/22/2024    WISDOM TOOTH EXTRACTION         Review of patient's allergies indicates:   Allergen Reactions    Phenergan plain      reaction       Outpatient Medications Marked as Taking for the 8/6/25 encounter (Office Visit) with Sincere Rehman DO   Medication Sig Dispense Refill    [DISCONTINUED] EScitalopram oxalate (LEXAPRO) 10 MG tablet Take 1 tablet (10 mg total) by mouth once daily. 30 tablet 11       Social History[1]     Family History   Problem Relation Name Age of Onset    Acute lymphoblastic leukemia Mother AML     Cancer Mother AML     Hyperlipidemia Father Taurus     Hypertension Father Taurus     Diabetes Father Taurus     Heart attack Brother Mike     Hypertension Brother Mike         Patient Care Team:  Sincere Rehman DO as PCP - General (Family Medicine)     Subjective:     ROS    See HPI for details  All Other ROS: Negative except as stated in HPI.       Objective:     /80 (BP Location: Left arm, Patient Position: Sitting)   Pulse 78   Temp 97.6 °F (36.4 °C)   Resp 20   Ht 5' 5" (1.651 m)   Wt 67.4 kg (148 lb 9.6 oz)   LMP 07/04/2025 (Exact Date)   SpO2 98%   BMI 24.73 kg/m²     Physical Exam  Vitals reviewed.   Constitutional:       General: She is not in acute distress.     Appearance: Normal appearance.   Cardiovascular:      Rate and Rhythm: Normal rate and regular rhythm.      Heart sounds: No " murmur heard.     No friction rub. No gallop.   Pulmonary:      Effort: No respiratory distress.      Breath sounds: No wheezing, rhonchi or rales.   Musculoskeletal:         General: No swelling, tenderness or deformity.      Right lower leg: No edema.      Left lower leg: No edema.   Skin:     General: Skin is warm and dry.      Findings: No lesion or rash.   Neurological:      General: No focal deficit present.      Mental Status: She is alert.   Psychiatric:         Mood and Affect: Mood normal.         Assessment/Plan:     1. Major depressive disorder, single episode, in full remission  -     EScitalopram oxalate (LEXAPRO) 20 MG tablet; Take 1 tablet (20 mg total) by mouth once daily.  Dispense: 90 tablet; Refill: 3          Assessment & Plan               This note was generated with the assistance of ambient listening technology. Verbal consent was obtained by the patient and accompanying visitor(s) for the recording of patient appointment to facilitate this note. I attest to having reviewed and edited the generated note for accuracy, though some syntax or spelling errors may persist. Please contact the author of this note for any clarification.     Follow up:     Follow up in about 6 months (around 2/6/2026) for Wellness. In addition to their scheduled follow up, the patient has also been instructed to follow up on as needed basis.            [1]   Social History  Socioeconomic History    Marital status: Unknown   Tobacco Use    Smoking status: Former     Current packs/day: 0.00     Types: Cigarettes    Smokeless tobacco: Never   Substance and Sexual Activity    Alcohol use: Never    Drug use: Never    Sexual activity: Yes     Partners: Male     Birth control/protection: See Surgical Hx     Social Drivers of Health     Financial Resource Strain: Low Risk  (8/6/2025)    Overall Financial Resource Strain (CARDIA)     Difficulty of Paying Living Expenses: Not hard at all   Food Insecurity: No Food Insecurity  (8/6/2025)    Hunger Vital Sign     Worried About Running Out of Food in the Last Year: Never true     Ran Out of Food in the Last Year: Never true   Transportation Needs: No Transportation Needs (8/6/2025)    PRAPARE - Transportation     Lack of Transportation (Medical): No     Lack of Transportation (Non-Medical): No   Physical Activity: Insufficiently Active (8/6/2025)    Exercise Vital Sign     Days of Exercise per Week: 2 days     Minutes of Exercise per Session: 60 min   Stress: Stress Concern Present (8/6/2025)    [place] [employer] [place] [employer] - Occupational Stress Questionnaire     Feeling of Stress : Very much   Housing Stability: Low Risk  (8/6/2025)    Housing Stability Vital Sign     Unable to Pay for Housing in the Last Year: No     Number of Times Moved in the Last Year: 0     Homeless in the Last Year: No

## 2025-08-28 ENCOUNTER — HOSPITAL ENCOUNTER (OUTPATIENT)
Dept: RADIOLOGY | Facility: HOSPITAL | Age: 45
Discharge: HOME | End: 2025-08-28
Attending: FAMILY MEDICINE
Payer: COMMERCIAL

## 2025-08-28 DIAGNOSIS — Z12.39 SCREENING BREAST EXAMINATION: ICD-10-CM

## 2025-08-28 PROCEDURE — 77063 BREAST TOMOSYNTHESIS BI: CPT | Mod: TC
